# Patient Record
Sex: MALE | Race: WHITE | NOT HISPANIC OR LATINO | Employment: FULL TIME | URBAN - METROPOLITAN AREA
[De-identification: names, ages, dates, MRNs, and addresses within clinical notes are randomized per-mention and may not be internally consistent; named-entity substitution may affect disease eponyms.]

---

## 2017-11-20 ENCOUNTER — COMMUNICATION - HEALTHEAST (OUTPATIENT)
Dept: SCHEDULING | Facility: CLINIC | Age: 42
End: 2017-11-20

## 2017-11-21 ENCOUNTER — OFFICE VISIT - HEALTHEAST (OUTPATIENT)
Dept: INTERNAL MEDICINE | Facility: CLINIC | Age: 42
End: 2017-11-21

## 2017-11-21 ENCOUNTER — RECORDS - HEALTHEAST (OUTPATIENT)
Dept: GENERAL RADIOLOGY | Facility: CLINIC | Age: 42
End: 2017-11-21

## 2017-11-21 DIAGNOSIS — M79.671 PAIN IN RIGHT FOOT: ICD-10-CM

## 2017-11-21 DIAGNOSIS — M79.671 RIGHT FOOT PAIN: ICD-10-CM

## 2017-11-21 ASSESSMENT — MIFFLIN-ST. JEOR: SCORE: 1620.62

## 2017-11-22 ENCOUNTER — COMMUNICATION - HEALTHEAST (OUTPATIENT)
Dept: INTERNAL MEDICINE | Facility: CLINIC | Age: 42
End: 2017-11-22

## 2017-11-22 ENCOUNTER — RECORDS - HEALTHEAST (OUTPATIENT)
Dept: ADMINISTRATIVE | Facility: OTHER | Age: 42
End: 2017-11-22

## 2017-12-06 ENCOUNTER — RECORDS - HEALTHEAST (OUTPATIENT)
Dept: ADMINISTRATIVE | Facility: OTHER | Age: 42
End: 2017-12-06

## 2018-10-01 ENCOUNTER — OFFICE VISIT - HEALTHEAST (OUTPATIENT)
Dept: INTERNAL MEDICINE | Facility: CLINIC | Age: 43
End: 2018-10-01

## 2018-10-01 ENCOUNTER — COMMUNICATION - HEALTHEAST (OUTPATIENT)
Dept: SCHEDULING | Facility: CLINIC | Age: 43
End: 2018-10-01

## 2018-10-01 DIAGNOSIS — N52.9 ED (ERECTILE DYSFUNCTION): ICD-10-CM

## 2018-10-01 DIAGNOSIS — K64.4 EXTERNAL HEMORRHOIDS: ICD-10-CM

## 2018-10-09 ENCOUNTER — RECORDS - HEALTHEAST (OUTPATIENT)
Dept: ADMINISTRATIVE | Facility: OTHER | Age: 43
End: 2018-10-09

## 2020-01-09 ENCOUNTER — COMMUNICATION - HEALTHEAST (OUTPATIENT)
Dept: SCHEDULING | Facility: CLINIC | Age: 45
End: 2020-01-09

## 2020-01-13 ENCOUNTER — COMMUNICATION - HEALTHEAST (OUTPATIENT)
Dept: TELEHEALTH | Facility: CLINIC | Age: 45
End: 2020-01-13

## 2020-01-13 ENCOUNTER — OFFICE VISIT - HEALTHEAST (OUTPATIENT)
Dept: INTERNAL MEDICINE | Facility: CLINIC | Age: 45
End: 2020-01-13

## 2020-01-13 DIAGNOSIS — Z56.0 UNEMPLOYMENT: ICD-10-CM

## 2020-01-13 DIAGNOSIS — K21.00 GASTROESOPHAGEAL REFLUX DISEASE WITH ESOPHAGITIS: ICD-10-CM

## 2020-01-13 SDOH — ECONOMIC STABILITY - INCOME SECURITY: UNEMPLOYMENT, UNSPECIFIED: Z56.0

## 2020-01-16 ENCOUNTER — COMMUNICATION - HEALTHEAST (OUTPATIENT)
Dept: NURSING | Facility: CLINIC | Age: 45
End: 2020-01-16

## 2020-01-20 ENCOUNTER — COMMUNICATION - HEALTHEAST (OUTPATIENT)
Dept: SCHEDULING | Facility: CLINIC | Age: 45
End: 2020-01-20

## 2020-01-20 ENCOUNTER — OFFICE VISIT - HEALTHEAST (OUTPATIENT)
Dept: INTERNAL MEDICINE | Facility: CLINIC | Age: 45
End: 2020-01-20

## 2020-01-20 DIAGNOSIS — R60.9 EDEMA, UNSPECIFIED TYPE: ICD-10-CM

## 2020-03-05 ENCOUNTER — COMMUNICATION - HEALTHEAST (OUTPATIENT)
Dept: INTERNAL MEDICINE | Facility: CLINIC | Age: 45
End: 2020-03-05

## 2020-03-06 ENCOUNTER — AMBULATORY - HEALTHEAST (OUTPATIENT)
Dept: INTERNAL MEDICINE | Facility: CLINIC | Age: 45
End: 2020-03-06

## 2020-03-06 DIAGNOSIS — K22.70 BARRETT'S ESOPHAGUS: ICD-10-CM

## 2020-03-13 ENCOUNTER — RECORDS - HEALTHEAST (OUTPATIENT)
Dept: ADMINISTRATIVE | Facility: OTHER | Age: 45
End: 2020-03-13

## 2021-05-24 ENCOUNTER — RECORDS - HEALTHEAST (OUTPATIENT)
Dept: ADMINISTRATIVE | Facility: CLINIC | Age: 46
End: 2021-05-24

## 2021-05-26 ENCOUNTER — RECORDS - HEALTHEAST (OUTPATIENT)
Dept: ADMINISTRATIVE | Facility: CLINIC | Age: 46
End: 2021-05-26

## 2021-05-30 ENCOUNTER — RECORDS - HEALTHEAST (OUTPATIENT)
Dept: ADMINISTRATIVE | Facility: CLINIC | Age: 46
End: 2021-05-30

## 2021-05-31 ENCOUNTER — RECORDS - HEALTHEAST (OUTPATIENT)
Dept: ADMINISTRATIVE | Facility: CLINIC | Age: 46
End: 2021-05-31

## 2021-05-31 VITALS — WEIGHT: 159.5 LBS | HEIGHT: 71 IN | BODY MASS INDEX: 22.33 KG/M2

## 2021-06-02 VITALS — BODY MASS INDEX: 21.76 KG/M2 | WEIGHT: 156 LBS

## 2021-06-03 ENCOUNTER — RECORDS - HEALTHEAST (OUTPATIENT)
Dept: ADMINISTRATIVE | Facility: CLINIC | Age: 46
End: 2021-06-03

## 2021-06-04 VITALS
HEART RATE: 79 BPM | WEIGHT: 140 LBS | DIASTOLIC BLOOD PRESSURE: 72 MMHG | BODY MASS INDEX: 18.99 KG/M2 | OXYGEN SATURATION: 99 % | SYSTOLIC BLOOD PRESSURE: 114 MMHG | TEMPERATURE: 98.4 F

## 2021-06-04 VITALS
OXYGEN SATURATION: 98 % | DIASTOLIC BLOOD PRESSURE: 84 MMHG | SYSTOLIC BLOOD PRESSURE: 114 MMHG | HEART RATE: 77 BPM | WEIGHT: 140 LBS | BODY MASS INDEX: 18.99 KG/M2

## 2021-06-05 NOTE — PROGRESS NOTES
[facility] Clinic Follow Up Note    José Manuel Toussaint   44 y.o. male    Date of Visit: 1/20/2020    Chief Complaint   Patient presents with     Leg Swelling     bilateral sides - X3 days     Subjective  This is a 44-year-old man who has known esophageal reflux and Catherine's esophagus.  He was seen a week ago in the office and I have reviewed those notes.  Because he is developed some swelling in the lower extremities.  He is not sure if this is related to his twice daily Prilosec or if it is something he is eating.  The only changes in his diet in the past week or that he may have increased his salt intake slightly and his dairy intake slightly.  Otherwise he is feeling a little better but does notice the tightness in the feet and ankles.    ROS A comprehensive review of systems was performed and was otherwise negative    Medications, allergies, and problem list were reviewed and updated    Exam  General Appearance:   On examination today his blood pressure is 114/72.  Weight is 140 pounds which is the same as last week.    Heart rhythm is stable with rate of 80 and no ectopy.    There is just trace to 1+ edema in both lower extremities below the knees.    The patient is alert and oriented x3.      Assessment/Plan  1. Edema, unspecified type       New edema in the lower extremities.  This is certainly not typical of Prilosec but there are very few changes otherwise.  He and I discussed the situation and I suggested that he cut his Prilosec back to once a day for the next few days and also that he reduce the salt and dairy intake in his diet over that same period of time.  If this is not helping he will follow-up with his own physician.      Roscoe Cisneros MD      Current Outpatient Medications on File Prior to Visit   Medication Sig     omeprazole (PRILOSEC) 20 MG capsule Take 1 capsule (20 mg total) by mouth 2 (two) times a day before meals.     ondansetron (ZOFRAN-ODT) 4 MG disintegrating  tablet Take 1 tablet (4 mg total) by mouth every 8 (eight) hours as needed for nausea.     No current facility-administered medications on file prior to visit.      Allergies   Allergen Reactions     Metoclopramide Hcl      Tremor       Prochlorperazine Edisylate      Tremor       Social History     Tobacco Use     Smoking status: Never Smoker     Smokeless tobacco: Never Used   Substance Use Topics     Alcohol use: Not Currently     Frequency: Never     Drug use: Yes     Types: Marijuana     Comment: 1 gram per day

## 2021-06-05 NOTE — TELEPHONE ENCOUNTER
Recently put on heavy dose of Prilosec.    Extreme swelling x 2 days (toes to abdomen)    Skin is tight to touch. Today stomach and hands also swollen today.    Last took Prilosec yesterday.    Low appetite.    No shortness of breath but at times feels breathing is labored.    Does not want to go to Ed due to no insurance currently.    Transferred to scheduling for an appointment for this morning.    Gita Meadows RN [facility] Triage Nurse Advisor

## 2021-06-05 NOTE — PATIENT INSTRUCTIONS - HE
Start taking omeprazole 20 mg two times a day for the next 6-8 weeks. Then take daily  I will also place a referral to connect with our care coordination team to help with addressing your employer and your insurance status. Ideally, it would be great if this was set up so that you can gain insurance. I NEED TO HEAR BACK FROM YOU ONCE THIS IS RESOLVED, SO THAT I CAN PLACE A REFERRAL FOR A REPEAT ENDOCSCOPY  Try to continue to abstain from alcohol  Please abstain from NSAIDs - ibuprofen, aleve, advil and naproxen  You can take acetaminophen, but no more than 3000 mg in 24 hours and 1000 mg at a time.

## 2021-06-05 NOTE — TELEPHONE ENCOUNTER
Reason for Disposition    MODERATE swelling of both ankles (e.g., swelling extends up to the knees) AND new onset or worsening    Protocols used: LEG SWELLING AND EDEMA-A-OH

## 2021-06-05 NOTE — PROGRESS NOTES
[facility] Clinic Note  José Manuel Toussaint   44 y.o. male    Date of Visit: 1/13/2020  Chief Complaint   Patient presents with     [facility]     Hospital Visit Follow Up     [facility] for N&V 1/10-1/11     Assessment/Plan  1. Gastroesophageal reflux disease with esophagitis  Symptoms unlikely related to CVS/CHS.  No documented Catherine's esophagitis and is overdue by roughly 2 years for follow-up EGD.  Counseled to stay well-hydrated, abstain from NSAIDs and minimize alcohol intake.  Will take omeprazole twice daily for 6 to 8 weeks, followed by taking it daily.  Hopefully with reestablishment of insurance, will then let me know via telephone/MyChart, for me to place a referral for EGD.  We will continue to use omeprazole as needed in light of side effects from Reglan and Compazine.  - omeprazole (PRILOSEC) 20 MG capsule; Take 1 capsule (20 mg total) by mouth 2 (two) times a day before meals.  Dispense: 90 capsule; Refill: 1    2. Unemployment  - Ambulatory referral to Care Management (Primary Care)     Much or all of the text in this note was generated through the use of Dragon Dictate voice-to-text software. Errors in spelling or words which seem out of context are unintentional. Sound alike errors, in particular, may have escaped editing  Sin Navarrete MD    Return if symptoms worsen or fail to improve.    Subjective  This 44 y.o. old male who presents for an ED follow-up. Admitted on 1/10/2020 after presenting with nausea and vomiting.  Diagnosed with cannabis hyperemesis syndrome, abnormal LFTs, acute kidney injury (2.41) and leukocytosis (16) presumed reactive.  Lipase 25. Seen with similar symptoms a few days earlier, and discharged with Zofran at that time.  By 1/11/2020, creatinine improved to 1.05, WBC 10.0 and lactic acid 1.0.   Seems like he is having a nausea in the AM. Woke up this AM, and still dealing nausea. Taking zofran first thing in the AM. No dry heaving and  no emesis. Appetite has been poor. Endorses abstaining since discharge. NO cough, shortness of breath or fever. Endorse smoking it for recreational reasons. 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   Dx with [employer] in 2015, with plan to f/u in 3 years, which was not done. Always a thin person. No long term night sweats outside of this recent episode. Endorses not using the omeprazole 20 mg daily for 2 years. Allergic to compazine and reglan. Trying to drink as much water. States his stomach feels tight, but no chest pain. No hematemesis. QTc 418 ms on 1/1/2020. No blood in stool or hematemesis. Was taking Aleve daily 2 to 4 days prior to this episode. Do not drink alcohol. Does not smoke cigarettes.    Works as a reynoso, with a [employer].  Currently unemployed but hoping to get some projects in the near future.    ROS A comprehensive review of systems was performed and was otherwise negative    Medications, allergies, and problem list were reviewed and updated    Exam  General appearance: Pleasant, nontoxic-appearing, no acute distress, alert and oriented x4  Vitals:    01/13/20 0934   BP: 114/84   Pulse: 77   SpO2: 98%   EYES: Eyelids, conjunctiva, and sclera were normal.  HEAD, EARS, NOSE, MOUTH, AND THROAT: Head and face were normal. Hearing was normal to voice. Oropharynx was normal.  RESPIRATORY: Bilaterally with no crackles, wheezing or rhonchi  CARDIOVASCULAR: Regular S1 and S2.  Radial pulses intact.  No edema.  GASTROINTESTINAL: NABS, soft, nontender, nondistended, no hepatomegaly  MUSCULOSKELETAL: Skeletal configuration was normal and muscle mass was normal for age.   Additional Information   Current Outpatient Medications   Medication Sig Dispense Refill     ondansetron (ZOFRAN-ODT) 4 MG disintegrating tablet Take 1 tablet (4 mg total) by mouth every 8 (eight) hours as needed for nausea. 30 tablet 0     omeprazole (PRILOSEC) 20 MG capsule Take 1 capsule (20 mg total) by mouth 2 (two)  times a day before meals. 90 capsule 1     No current facility-administered medications for this visit.      Allergies   Allergen Reactions     Metoclopramide Hcl      Tremor       Prochlorperazine Edisylate      Tremor       Social History     Social History Narrative     Not on file     Social History     Tobacco Use     Smoking status: Never Smoker     Smokeless tobacco: Never Used   Substance Use Topics     Alcohol use: Not Currently     Frequency: Never     Drug use: Yes     Types: Marijuana     Comment: 1 gram per day     No family history on file.  Time: total time spent with the patient was 25 minutes of which >50% was spent in counseling and coordination of care

## 2021-06-05 NOTE — PROGRESS NOTES
Patient has talked with someone about insurance and knows he is over income to qualify for programs to assist him. He believes he will be over income for tyler care as well. He has declined enrollment in [facility] at this time.  CHW will keep patient's information and will send him information if there are any programs to keep him out of this situation in the future. May need to talk with his union about COBRA plans.    The clinic Community Health Worker talked with the patient today at the request of the PCP to discuss possible Clinic Care Coordination enrollment.  The service was described to the patient and immediate needs were discussed.  The patient declined enrollement at this time.  The PCP is encouraged to refer in the future if the patient's needs change.

## 2021-06-05 NOTE — TELEPHONE ENCOUNTER
Tuesday vomiting started. Was seen in ED.    From smoking too much pot.    2 days later now and not getting in any water.  Minimal urine output. Still vomiting.    No pcp.    Asking if he can go to urgent care for IV, morphine and anti nausea meds.    Needs to return to ED.  Patient agrees with plan.    Gita Meadows RN [facility] Triage Nurse Advisor    Reason for Disposition    SEVERE vomiting (e.g., 6 or more times/day)    Protocols used: VOMITING-A-OH

## 2021-06-06 NOTE — TELEPHONE ENCOUNTER
Order needed:    Patient now has insurance and wants to move forward with scheduling an upper endoscopy. Patient talked about this with Dr. Navarrete during the 1/13/20 appointment.     Please place order if ok to move forward.     Thank you

## 2021-06-14 NOTE — PROGRESS NOTES
"Mitesh McGlone  1975      Assessment and Plan:  1 sprained right foot; x-ray looks negative; will continue to use walking boot; symptoms seem somewhat disproportionate to physical findings we will get him in to see podiatry      Chief Complaint: Right foot pain injury    Visit diagnoses:    1. Right foot pain  XR Foot Right 3 or More VWS    Ambulatory referral to Podiatry       Meds:  Current Outpatient Prescriptions   Medication Sig Dispense Refill     lansoprazole (PREVACID SOLUTAB) 30 MG disintegrating tablet Take 1 tablet (30 mg total) by mouth daily. 90 tablet 3     ondansetron (ZOFRAN) 4 MG tablet Take 1 tablet (4 mg total) by mouth every 8 (eight) hours as needed for nausea. 20 tablet 4     No current facility-administered medications for this visit.        Allergies   Allergen Reactions     Metoclopramide Hcl      Tremor       Prochlorperazine Edisylate      Tremor         ROS: complete review of symptoms otherwise negative except as noted below    S: About a week and a half ago he was running with his dog tripped and his dog stepped on his right foot.  Kishan has had pain ever since he has a walking boot left over from when he fractured his ankle things do not seem to be getting much better so that he made the appointment today.       O:   Vitals:    11/21/17 0952   BP: 108/68   Patient Site: Left Arm   Patient Position: Sitting   Cuff Size: Adult Regular   Pulse: 72   Resp: 16   SpO2: 99%   Weight: 159 lb 8 oz (72.3 kg)   Height: 5' 11\" (1.803 m)       Physical Exam:  General-  VS- see above    Extremities: no edema, good distal pulses; minimal tenderness over the base of the second and third toe distal metatarsal on the right foot not a lot of edema good pulses plantar palpation unremarkable      X-ray of right foot negative will await radiologist official interpretation      Jayme Ca MD              "

## 2021-06-16 PROBLEM — N17.9 AKI (ACUTE KIDNEY INJURY) (H): Status: ACTIVE | Noted: 2020-01-10

## 2021-06-20 NOTE — PROGRESS NOTES
José Manuel Toussaint  1975      Assessment and Plan:  1 recurrent external hemorrhoids refer to colorectal surgery; Anusol suppositories sitz bath he is currently not constipated  2 he has a girlfriend wants some Viagra is got some ED issues which are not major will send in a prescription not sure if his insurance will cover if not he can try to get them from [facility]      Chief Complaint: Hemorrhoids and ED    Visit diagnoses:    1. External hemorrhoids  Ambulatory referral to Colorectal Surgery    hydrocortisone 25 mg suppository   2. ED (erectile dysfunction)  sildenafil (VIAGRA) 100 MG tablet       Meds:  Current Outpatient Prescriptions   Medication Sig Dispense Refill     hydrocortisone 25 mg suppository Insert 1 suppository (25 mg total) into the rectum every 12 (twelve) hours. 12 suppository 1     sildenafil (VIAGRA) 100 MG tablet Take 1 tablet (100 mg total) by mouth as needed for erectile dysfunction. 6 tablet 12     No current facility-administered medications for this visit.        Allergies   Allergen Reactions     Metoclopramide Hcl      Tremor       Prochlorperazine Edisylate      Tremor         ROS: complete review of symptoms otherwise negative except as noted below    S: Overall José Manuel is doing reasonably well he is not having much in way gastrointestinal symptoms no recurrent nausea and vomiting problems he is cut down in the amount of pot he smokes but has not abstained entirely.  Does not drink alcohol.  He has hemorrhoid problems and needs a referral he also wants to try some Viagra and has been sent in if it is too expensive we can try to help him get some from [facility]       O:   Vitals:    10/01/18 1106   BP: 108/62   Patient Site: Left Arm   Patient Position: Sitting   Cuff Size: Adult Regular   Pulse: 78   Weight: 156 lb (70.8 kg)       Physical Exam:  General-  VS- see above      Jayme Ca MD

## 2021-11-24 ENCOUNTER — ANCILLARY PROCEDURE (OUTPATIENT)
Dept: GENERAL RADIOLOGY | Facility: CLINIC | Age: 46
End: 2021-11-24
Attending: NURSE PRACTITIONER
Payer: COMMERCIAL

## 2021-11-24 ENCOUNTER — OFFICE VISIT (OUTPATIENT)
Dept: URGENT CARE | Facility: URGENT CARE | Age: 46
End: 2021-11-24
Payer: COMMERCIAL

## 2021-11-24 VITALS
RESPIRATION RATE: 15 BRPM | OXYGEN SATURATION: 96 % | BODY MASS INDEX: 20.3 KG/M2 | TEMPERATURE: 98.6 F | DIASTOLIC BLOOD PRESSURE: 66 MMHG | HEART RATE: 77 BPM | HEIGHT: 71 IN | SYSTOLIC BLOOD PRESSURE: 98 MMHG | WEIGHT: 145 LBS

## 2021-11-24 DIAGNOSIS — R05.9 COUGH: ICD-10-CM

## 2021-11-24 DIAGNOSIS — J40 BRONCHITIS: Primary | ICD-10-CM

## 2021-11-24 PROCEDURE — 71046 X-RAY EXAM CHEST 2 VIEWS: CPT | Performed by: RADIOLOGY

## 2021-11-24 PROCEDURE — 99214 OFFICE O/P EST MOD 30 MIN: CPT | Performed by: NURSE PRACTITIONER

## 2021-11-24 RX ORDER — AZITHROMYCIN 250 MG/1
TABLET, FILM COATED ORAL
Qty: 6 TABLET | Refills: 0 | Status: SHIPPED | OUTPATIENT
Start: 2021-11-24 | End: 2021-11-29

## 2021-11-24 RX ORDER — BENZONATATE 100 MG/1
100 CAPSULE ORAL 3 TIMES DAILY PRN
Qty: 21 CAPSULE | Refills: 0 | Status: SHIPPED | OUTPATIENT
Start: 2021-11-24 | End: 2021-12-01

## 2021-11-24 ASSESSMENT — MIFFLIN-ST. JEOR: SCORE: 1554.85

## 2021-11-24 NOTE — PATIENT INSTRUCTIONS
Zpak and tessalon perles for bronchitis  There is discoid atelectasis, fluid mucus in lungs  No lobar pneumonia  Rest! Your body needs more rest to heal.  Drink plenty of fluids (warm fluids like tea or soup are soothing and reduce cough)  Sit in the bathroom with a hot shower running and breathe in the steam.  Honey may soothe your sore throat and help manage your cough- may take straight or in warm water with lemon juice.  Avoid smoke (cigarettes, bonfires, fireplace, wood burning stoves).  Take Tylenol or an NSAID such as ibuprofen or naproxen as needed for pain.  Delsym (dextromethorphan polistirex) is an over the counter cough medication that lasts 12 hours.   Mucinex or Robitussin (guiafenesin) thin mucus and may help it to loosen more quickly  Good handwashing is the best way to prevent spread of germs  Present to emergency room if you develop trouble breathing, swallowing or cough-up blood, worsening chest pain, palpitations, calf pain, dizziness.  Follow up with your primary care provider if symptoms worsen or fail to improve as expected.

## 2021-11-24 NOTE — PROGRESS NOTES
"Chief Complaint   Patient presents with     Urgent Care     URI     coughing since 11/16, would like chest xray     SUBJECTIVE:  José Manuel Toussaint is a 46 year old male presenting with concerns for a lung infection. He has had cough, sob, chest heaviness, thick mucus, wheezing for 9 days. Asthma history, albuterol not helping. He smokes marijuana. No relevant past cardiopulmonary history. No dizziness, palpitations, calf swelling, hemoptysis, fever. His covid test yesterday was negative.    Past Medical History:   Diagnosis Date     Catherine's esophagus      Esophageal reflux      Gastropathy      GERD (gastroesophageal reflux disease)      Nausea with vomiting      omeprazole (PRILOSEC) 20 MG capsule, [OMEPRAZOLE (PRILOSEC) 20 MG CAPSULE] Take 1 capsule (20 mg total) by mouth 2 (two) times a day before meals.  ondansetron (ZOFRAN-ODT) 4 MG disintegrating tablet, [ONDANSETRON (ZOFRAN-ODT) 4 MG DISINTEGRATING TABLET] Take 1 tablet (4 mg total) by mouth every 8 (eight) hours as needed for nausea.    No current facility-administered medications on file prior to visit.    Social History     Tobacco Use     Smoking status: Never Smoker     Smokeless tobacco: Never Used   Substance Use Topics     Alcohol use: Not Currently     Allergies   Allergen Reactions     Metoclopramide Hcl [Metoclopramide] Unknown     Tremor       Nsaids      Prochlorperazine Edisylate [Prochlorperazine] Unknown     Tremor       Review of Systems   All systems negative except for those listed above in HPI.    OBJECTIVE:   BP 98/66   Pulse 77   Temp 98.6  F (37  C) (Temporal)   Resp 15   Ht 1.803 m (5' 11\")   Wt 65.8 kg (145 lb)   SpO2 96%   BMI 20.22 kg/m       Physical Exam  Vitals reviewed.   Constitutional:       General: He is not in acute distress.     Appearance: Normal appearance. He is not ill-appearing, toxic-appearing or diaphoretic.   HENT:      Head: Normocephalic and atraumatic.      Nose: Nose normal.   Cardiovascular:      Rate " and Rhythm: Normal rate.      Pulses: Normal pulses.      Heart sounds: Normal heart sounds.   Pulmonary:      Effort: Respiratory distress (cough) present.      Breath sounds: No stridor. Wheezing and rhonchi present. No rales.   Skin:     General: Skin is warm and dry.   Neurological:      General: No focal deficit present.      Mental Status: He is alert and oriented to person, place, and time.      Cranial Nerves: No cranial nerve deficit.      Motor: No weakness.      Gait: Gait normal.   Psychiatric:         Mood and Affect: Mood normal.         Behavior: Behavior normal.       Xray done in clinic read by me as positive for right sided discoid atelectasis, mild hazy opacities.  Results for orders placed or performed in visit on 11/24/21   XR Chest 2 Views     Status: None    Narrative    XR CHEST 2 VW  11/24/2021 12:42 PM       INDICATION: cough, sob, chest heaviness, thick mucus, wheezing for 9  days, asthma history, albuterol not helping, left lung rhonchi,  wheezes  COMPARISON: None       Impression    IMPRESSION: The lungs are hyperinflated. There is discoid atelectasis  or scarring in the left lung base. Lungs are otherwise clear. Heart  size normal.    KELSEY KHAN MD         SYSTEM ID:  [id number]     ASSESSMENT:    ICD-10-CM    1. Bronchitis  J40 azithromycin (ZITHROMAX) 250 MG tablet     benzonatate (TESSALON) 100 MG capsule   2. Cough  R05.9 XR Chest 2 Views     PLAN:     Zpak and tessalon perles for bronchitis  There is discoid atelectasis, fluid mucus in lungs  No lobar pneumonia  Rest! Your body needs more rest to heal.  Drink plenty of fluids (warm fluids like tea or soup are soothing and reduce cough)  Sit in the bathroom with a hot shower running and breathe in the steam.  Honey may soothe your sore throat and help manage your cough- may take straight or in warm water with lemon juice.  Avoid smoke (cigarettes, bonfires, fireplace, wood burning stoves).  Take Tylenol or an NSAID such as  ibuprofen or naproxen as needed for pain.  Delsym (dextromethorphan polistirex) is an over the counter cough medication that lasts 12 hours.   Mucinex or Robitussin (guiafenesin) thin mucus and may help it to loosen more quickly  Good handwashing is the best way to prevent spread of germs  Present to emergency room if you develop trouble breathing, swallowing or cough-up blood, worsening chest pain, palpitations, calf pain, dizziness.  Follow up with your primary care provider if symptoms worsen or fail to improve as expected.    Follow up with primary care provider with any problems, questions or concerns or if symptoms worsen or fail to improve. Patient agreed to plan and verbalized understanding.    DAVID Mai-[facility] C[facility] [place]

## 2022-04-02 ENCOUNTER — HEALTH MAINTENANCE LETTER (OUTPATIENT)
Age: 47
End: 2022-04-02

## 2022-10-01 ENCOUNTER — HEALTH MAINTENANCE LETTER (OUTPATIENT)
Age: 47
End: 2022-10-01

## 2023-05-14 ENCOUNTER — HEALTH MAINTENANCE LETTER (OUTPATIENT)
Age: 48
End: 2023-05-14

## 2023-11-02 ENCOUNTER — HOSPITAL ENCOUNTER (EMERGENCY)
Facility: HOSPITAL | Age: 48
Discharge: HOME | End: 2023-11-02
Payer: COMMERCIAL

## 2023-11-02 ENCOUNTER — OFFICE VISIT (OUTPATIENT)
Dept: FAMILY MEDICINE | Facility: CLINIC | Age: 48
End: 2023-11-02
Payer: COMMERCIAL

## 2023-11-02 VITALS
WEIGHT: 144 LBS | HEART RATE: 65 BPM | BODY MASS INDEX: 20.08 KG/M2 | DIASTOLIC BLOOD PRESSURE: 68 MMHG | RESPIRATION RATE: 14 BRPM | SYSTOLIC BLOOD PRESSURE: 112 MMHG | TEMPERATURE: 98.4 F | OXYGEN SATURATION: 95 %

## 2023-11-02 DIAGNOSIS — R07.9 CHEST PAIN, UNSPECIFIED TYPE: Primary | ICD-10-CM

## 2023-11-02 LAB
ATRIAL RATE - MUSE: 64 BPM
DIASTOLIC BLOOD PRESSURE - MUSE: NORMAL MMHG
INTERPRETATION ECG - MUSE: NORMAL
P AXIS - MUSE: -13 DEGREES
PR INTERVAL - MUSE: 150 MS
QRS DURATION - MUSE: 80 MS
QT - MUSE: 372 MS
QTC - MUSE: 383 MS
R AXIS - MUSE: 76 DEGREES
SYSTOLIC BLOOD PRESSURE - MUSE: NORMAL MMHG
T AXIS - MUSE: 65 DEGREES
VENTRICULAR RATE- MUSE: 64 BPM

## 2023-11-02 PROCEDURE — 93010 ELECTROCARDIOGRAM REPORT: CPT | Performed by: INTERNAL MEDICINE

## 2023-11-02 PROCEDURE — 99214 OFFICE O/P EST MOD 30 MIN: CPT | Mod: 25 | Performed by: STUDENT IN AN ORGANIZED HEALTH CARE EDUCATION/TRAINING PROGRAM

## 2023-11-02 PROCEDURE — 93005 ELECTROCARDIOGRAM TRACING: CPT | Performed by: STUDENT IN AN ORGANIZED HEALTH CARE EDUCATION/TRAINING PROGRAM

## 2023-11-02 ASSESSMENT — ACTIVITIES OF DAILY LIVING (ADL)
ADLS_ACUITY_SCORE: 33
ADLS_ACUITY_SCORE: 33

## 2023-11-02 NOTE — PATIENT INSTRUCTIONS
Go to the Emergency Department for chest pain rule out    Informed that this institution does possess the capabilities and/or resources to provide for patient and should be transferred to a higher level of care.     Yes

## 2023-11-02 NOTE — ED NOTES
Expected Patient Referral to ED  12:27 PM    Referring Clinic/Provider:  [facility]    Reason for referral/Clinical facts:  48-year-old with history of reflux, 1 week of chest pressure, normal EKG    Recommendations provided:  Send to ED for further evaluation    Caller was informed that this institution does possess the capabilities and/or resources to provide for patient and should be transferred to our facility.    Discussed that if direct admit is sought and any hurdles are encountered, this ED would be happy to see the patient and evaluate.    Informed caller that recommendations provided are recommendations based only on the facts provided and that they responsible to accept or reject the advice, or to seek a formal in person consultation as needed and that this ED will see/treat patient should they arrive.      Rl Miguel MD  [facility] EMERGENCY DEPARTMENT  [address] [place] [id number]  [phone]       Rl Miguel MD  11/02/23 1227

## 2023-11-02 NOTE — PROGRESS NOTES
"Assessment & Plan     Chest pain, unspecified type  48-year-old man with history of daily marijuana use, GERD who presents with 1 week of constant sternal chest pain.  Differential includes but not limited to myocardial infarction, pericarditis, pleuritic pain, pneumonia, GERD, PE.  Vitals are within normal limits. On exam, the patient is nontoxic-appearing, well-hydrated and perfused, no respiratory distress, no murmur, or signs of volume overload.  ECG showed no ischemic changes, no ECG to compare. HEAR score 3. Informed that this institution does possess the capabilities and/or resources to provide for patient and should be transferred to a higher level of care.  Recommended patient go to the ED for further evaluation.  Signout given to [facility] ED provider.  The patient was discharged in stable condition and transferred by private car.  - EKG 12-lead, tracing only  - EKG 12-lead, tracing only             No follow-ups on file.    Trista Wood MD  [facility] DYLON Georges is a 48 year old male who presents to clinic today for the following health issues:  Chief Complaint   Patient presents with    Chest Pain     X 1 week, tight chest, feels like someone is stopping on his chest, negative covid test today.      HPI    Cardiac Event    Symptom Onset: 1 week(s) ago.  Timing of illness: constant  Character: chest tightness, sharp, \"feels like someone stepping on his chest\"  Current and Associated symptoms: has nasal congestion, dizziness with bending over  Denies fever, cough, nausea, shortness of breath  Severity: 7/10  Location: sternal  Radiation: none.  Associated Symptoms: none.  Exacerbated by: inhalation, sitting. 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      Objective    /68 (BP Location: Right arm, Patient Position: Sitting, Cuff Size: Adult Regular)   Pulse 65   Temp 98.4  F (36.9  C) (Oral)   Resp 14   Wt 65.3 kg (144 lb)   SpO2 95%   BMI 20.08 kg/m    Physical Exam   GENERAL: healthy, alert and no distress, nontoxic  EYES: Eyes grossly normal to inspection, and conjunctivae and sclerae normal  HENT: nose and mouth without ulcers or lesions  RESP: no increased work of breathing, symmetric air entry, lungs clear to auscultation - no rales, rhonchi or wheezes  CV: regular rate and rhythm, normal S1 S2, no S3 or S4, no murmur, normal cap refill, and peripheral pulses strong, no peripheral edema  MS: no gross musculoskeletal defects noted, no edema  SKIN: no suspicious lesions or rashes  NEURO: No focal neurologic deficits    EKG - Reviewed and interpreted by me appears normal, NSR, normal axis, normal intervals, no acute ST/T changes c/w ischemia, no LVH by voltage criteria, unchanged from previous tracings

## 2024-01-25 ENCOUNTER — OFFICE VISIT (OUTPATIENT)
Dept: FAMILY MEDICINE | Facility: CLINIC | Age: 49
End: 2024-01-25
Payer: COMMERCIAL

## 2024-01-25 ENCOUNTER — ANCILLARY PROCEDURE (OUTPATIENT)
Dept: GENERAL RADIOLOGY | Facility: CLINIC | Age: 49
End: 2024-01-25
Attending: FAMILY MEDICINE
Payer: COMMERCIAL

## 2024-01-25 VITALS
BODY MASS INDEX: 20.82 KG/M2 | HEIGHT: 71 IN | DIASTOLIC BLOOD PRESSURE: 68 MMHG | OXYGEN SATURATION: 95 % | RESPIRATION RATE: 13 BRPM | TEMPERATURE: 97.8 F | HEART RATE: 80 BPM | WEIGHT: 148.75 LBS | SYSTOLIC BLOOD PRESSURE: 110 MMHG

## 2024-01-25 DIAGNOSIS — F41.1 GENERALIZED ANXIETY DISORDER: ICD-10-CM

## 2024-01-25 DIAGNOSIS — Z77.090 HISTORY OF ASBESTOS EXPOSURE: ICD-10-CM

## 2024-01-25 DIAGNOSIS — Z12.11 SCREEN FOR COLON CANCER: ICD-10-CM

## 2024-01-25 DIAGNOSIS — Z11.4 SCREENING FOR HIV (HUMAN IMMUNODEFICIENCY VIRUS): ICD-10-CM

## 2024-01-25 DIAGNOSIS — M75.82 ROTATOR CUFF TENDONITIS, LEFT: ICD-10-CM

## 2024-01-25 DIAGNOSIS — R11.2 NAUSEA AND VOMITING, UNSPECIFIED VOMITING TYPE: ICD-10-CM

## 2024-01-25 DIAGNOSIS — R73.9 HYPERGLYCEMIA: ICD-10-CM

## 2024-01-25 DIAGNOSIS — F32.A DEPRESSION, UNSPECIFIED DEPRESSION TYPE: ICD-10-CM

## 2024-01-25 DIAGNOSIS — F12.90 MARIJUANA SMOKER, CONTINUOUS: ICD-10-CM

## 2024-01-25 DIAGNOSIS — G89.29 CHRONIC PAIN OF RIGHT KNEE: ICD-10-CM

## 2024-01-25 DIAGNOSIS — K22.719 BARRETT'S ESOPHAGUS WITH DYSPLASIA: ICD-10-CM

## 2024-01-25 DIAGNOSIS — H93.13 TINNITUS, BILATERAL: ICD-10-CM

## 2024-01-25 DIAGNOSIS — M25.561 CHRONIC PAIN OF RIGHT KNEE: ICD-10-CM

## 2024-01-25 DIAGNOSIS — Z00.00 HEALTHCARE MAINTENANCE: Primary | ICD-10-CM

## 2024-01-25 DIAGNOSIS — Z11.59 NEED FOR HEPATITIS C SCREENING TEST: ICD-10-CM

## 2024-01-25 PROBLEM — K21.9 ESOPHAGEAL REFLUX: Status: ACTIVE | Noted: 2024-01-25

## 2024-01-25 PROBLEM — K22.70 BARRETT'S ESOPHAGUS: Status: ACTIVE | Noted: 2024-01-25

## 2024-01-25 LAB
CHOLEST SERPL-MCNC: 70 MG/DL
FASTING STATUS PATIENT QL REPORTED: NO
GLUCOSE BLD-MCNC: 170 MG/DL (ref 60–99)
HCV AB SERPL QL IA: NONREACTIVE
HDLC SERPL-MCNC: 34 MG/DL
HIV 1+2 AB+HIV1 P24 AG SERPL QL IA: NONREACTIVE
LDLC SERPL CALC-MCNC: 24 MG/DL
NONHDLC SERPL-MCNC: 36 MG/DL
TRIGL SERPL-MCNC: 58 MG/DL
TSH SERPL DL<=0.005 MIU/L-ACNC: 1.69 UIU/ML (ref 0.3–4.2)

## 2024-01-25 PROCEDURE — 83036 HEMOGLOBIN GLYCOSYLATED A1C: CPT | Performed by: FAMILY MEDICINE

## 2024-01-25 PROCEDURE — 87389 HIV-1 AG W/HIV-1&-2 AB AG IA: CPT | Performed by: FAMILY MEDICINE

## 2024-01-25 PROCEDURE — 99213 OFFICE O/P EST LOW 20 MIN: CPT | Mod: 25 | Performed by: FAMILY MEDICINE

## 2024-01-25 PROCEDURE — 82947 ASSAY GLUCOSE BLOOD QUANT: CPT | Performed by: FAMILY MEDICINE

## 2024-01-25 PROCEDURE — 80061 LIPID PANEL: CPT | Performed by: FAMILY MEDICINE

## 2024-01-25 PROCEDURE — 86803 HEPATITIS C AB TEST: CPT | Performed by: FAMILY MEDICINE

## 2024-01-25 PROCEDURE — 71046 X-RAY EXAM CHEST 2 VIEWS: CPT | Mod: TC | Performed by: RADIOLOGY

## 2024-01-25 PROCEDURE — 36415 COLL VENOUS BLD VENIPUNCTURE: CPT | Performed by: FAMILY MEDICINE

## 2024-01-25 PROCEDURE — 99396 PREV VISIT EST AGE 40-64: CPT | Mod: 25 | Performed by: FAMILY MEDICINE

## 2024-01-25 PROCEDURE — 90715 TDAP VACCINE 7 YRS/> IM: CPT | Performed by: FAMILY MEDICINE

## 2024-01-25 PROCEDURE — 84443 ASSAY THYROID STIM HORMONE: CPT | Performed by: FAMILY MEDICINE

## 2024-01-25 PROCEDURE — 90471 IMMUNIZATION ADMIN: CPT | Performed by: FAMILY MEDICINE

## 2024-01-25 RX ORDER — ESCITALOPRAM OXALATE 10 MG/1
10 TABLET ORAL DAILY
Qty: 90 TABLET | Refills: 1 | Status: SHIPPED | OUTPATIENT
Start: 2024-01-25 | End: 2024-01-25

## 2024-01-25 ASSESSMENT — ENCOUNTER SYMPTOMS: NERVOUS/ANXIOUS: 1

## 2024-01-25 NOTE — COMMUNITY RESOURCES LIST (ENGLISH)
01/25/2024    [employer]  N/A  For questions about this resource list or additional care needs, please contact your primary care clinic or care manager.  Phone: [phone]   Email: N/A   Address: [address] [place] [id number]   Hours: N/A        Financial Stability       Rent and mortgage payment assistance  1  [facility] - Outpatient [facility] Distance: 0.78 miles      In-Person, Phone/Virtual   [address] [place] [address]  Language: English, Azeri  Hours: Mon - Fri 8:00 AM - 4:30 PM  Fees: Insurance   Phone: (414) 341-6278 Email: roots@[employer] Website: [email or web address]/     2  [facility] [employer] [place] - Multifamily Rental Assistance Program Distance: 3.05 miles      Phone/Virtual   [address] [place] [address]  Language: English  Hours: Mon - Fri 8:00 AM - 5:00 PM Appt. Only  Fees: Free   Phone: (194) 209-6092 Email: [place].[employer]@[facility]. Website: https://www.[employer].gov/index.html          Important Numbers & Websites       Emergency Services   911  [facility]   311  Poison Control   (569) 381-5818  Suicide Prevention Lifeline   (200) 945-4164 (TALK)  Child Abuse Hotline   (612) 449-6139 (4-A-Child)  Sexual Assault Hotline   (939) 535-8718 (HOPE)  National Runaway Safeline   (306) 854-5636 (RUNAWAY)  All-Options Talkline   (825) 369-5941  Substance Abuse Referral   (304) 920-6140 (HELP)

## 2024-01-25 NOTE — PROGRESS NOTES
Adult Male Physical  A/P  Catherine's esophagus  Patient reports to me today that he been told that he should have annual screenings.  Reviewing Dr. Diggs's note, no dysplasia noted.  Has been years since this was considered, referral back to [facility] for upper endoscopy.    Depression, unspecified depression type  Long-term issue, PHQ-9 today is 8.  Denies active suicidal ideation.  Anxiety seems more the problem.  Lexapro, referral to therapist.    Family history of addiction, not mental illness.  Current stressor of mom's failing health.    Generalized anxiety disorder  Chronic issue, SULEMAN 7 is 14.  Please note SULEMAN is a provisional diagnosis, I have not reviewed diagnostic criteria.  Seems to have some OCD characteristics or at least skin picking.  Referral to therapist, start Lexapro, follow-up 1 month.    Healthcare maintenance  Tdap, colon cancer screening with colonoscopy.    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  No breathing issues.  Requesting chest x-ray which I think is reasonable.  This was personally reviewed and appears normal.    Marijuana smoker, continuous  Briefly explored today, understands he is dependent on marijuana and is felt better when he stopped it in the past.  Further discussion going forward.  Treat anxiety and depression.    Nausea with vomiting  Does not seem to be related to marijuana use, better with cessation of nonsteroidals.    Rotator cuff tendonitis, left  Negative x-ray, fairly mild, seems prepared supraspinatus related.  Referral to physical therapy.    Tinnitus, bilateral  Lots of noise exposure, referral to ENT         HPI  Current Concerns/ Questions    48-year-old, history of esophageal reflux with Catherine's, nausea and vomiting, history of acute kidney injury.  Currently on omeprazole 20 mg, has ondansetron as well.     Patient has lots of questions today,  looking to establish care.    Lives with mom, has been under stress lately as she is going to be needing to go into care facility in the near future.  Starting to have memory issues.    Long-term history of generalized anxiety and depression.  He worries about everything, is also a skin , this began for 5 years ago.  Never has received any sort of mental health care in the past.  Not easy for him to talk about things.  Denies hallucinations.  There is a family history of alcoholism.    Patient is a daily marijuana smoker.  Not sure how he feels about this, knows that he is dependent on it.    History of Catherine's esophagus.  Also had vomiting in the past.  Diagnosed with gastropathy and told not to take nonsteroidals.  Found out that A[place]ha[place]ille is a nonsteroidal recently, once he stopped that his nausea got better.  Does not seem to be related to his marijuana use.    4 months of left shoulder pain when he reaches up and out to the left.  Very particular action that does this.'s first time he is ever had it.  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  Striae of left knee pain.  Previous injury as a child, led to surgery on his knee.  Wants to see a orthopedic surgeon to look into this.    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  Wondering if he should get a chest x-ray to get screened for asbestosis.    Lots of loud machines around, bilateral, right greater than left tinnitus over the last couple of years.  No noted hearing loss.    Laureen history of melanoma, also with basal cell carcinoma in his mom.  PFSH:  Current medications reviewed as follows:  omeprazole (PRILOSEC) 20 MG capsule, [OMEPRAZOLE (PRILOSEC) 20 MG CAPSULE] Take 1 capsule (20 mg total) by mouth 2 (two) times a day before meals. (Patient not taking: Reported on 1/25/2024)  ondansetron (ZOFRAN-ODT) 4 MG disintegrating tablet, [ONDANSETRON (ZOFRAN-ODT) 4 MG DISINTEGRATING TABLET] Take 1 tablet (4 mg total) by mouth  every 8 (eight) hours as needed for nausea. (Patient not taking: Reported on 1/25/2024)    No current facility-administered medications on file prior to visit.     Patient Active Problem List   Diagnosis    Esophageal reflux    Catherine's esophagus    Nausea with vomiting    Closed Fracture Of The Fibula    Gastropathy    SYLVIA (acute kidney injury) (H24)    Dysphagia    Healthcare maintenance    Generalized anxiety disorder    Depression, unspecified depression type    Marijuana smoker, continuous    Rotator cuff tendonitis, left    History of asbestos exposure    Tinnitus, bilateral     Past Medical History:   Diagnosis Date    Catherine's esophagus     Esophageal reflux     Gastropathy     GERD (gastroesophageal reflux disease)     Nausea with vomiting      Past Surgical History:   Procedure Laterality Date    HC KNEE SCOPE, DIAGNOSTIC      Description: Arthroscopy Knee;  Recorded: 11/19/2007;    ZZC APPENDECTOMY      Description: Appendectomy;  Recorded: 02/20/2009;    [facility],<4Y/O,REDUC      Description: Umbilical Hernia Repair;  Recorded: 03/15/2012;     No family history on file.  History   Smoking Status    Never   Smokeless Tobacco    Never       Social History     Social History Narrative    Not on file     Immunization History   Administered Date(s) Administered    COVID-19 12+ (2023-24) (MODERNA) 10/20/2023    COVID-19 Bivalent 18+ (Moderna) 11/04/2022    COVID-19 Monovalent 18+ (Moderna) 04/07/2021, 05/05/2021, 11/12/2021, 05/13/2022    Flu, Unspecified 11/21/2013, 11/04/2019    Influenza (IIV3) PF 11/19/2012, 11/20/2013    Influenza Vaccine >6 months,quad, PF 10/07/2017, 09/25/2020, 11/04/2022    Influenza Vaccine, 6+MO IM (QUADRIVALENT W/PRESERVATIVES) 10/01/2018, 11/04/2019    Influenza,INJ,MDCK,PF,Quad >6mo(Flucelvax) 11/12/2021, 09/23/2023    TDAP (Adacel,Boostrix) 05/22/2012, 01/25/2024    Td (Adult), Adsorbed 01/01/1999, 01/01/2003    Td,adult,historic,unspecified 01/01/1999,  "01/01/2003     Body mass index is 20.96 kg/m .        Objective  Physical Exam  Vitals:    01/25/24 0836   BP: 110/68   BP Location: Left arm   Patient Position: Sitting   Cuff Size: Adult Regular   Pulse: 80   Resp: 13   Temp: 97.8  F (36.6  C)   TempSrc: Oral   SpO2: 95%   Weight: 67.5 kg (148 lb 12 oz)   Height: 1.794 m (5' 10.63\")       Gen- alert and oriented x3, no acute distress  HEENT- Normocephalic atraumatic   pupils equal and reactive, EOMI.    TMs visualized and normal, ear canals normal.    Mouth moist with normal mucosa no ulceration, dentition normal or in good repair.  Neck- supple, no adenopathy or thyromegaly  Chest- Normal chest wall apperance, normal inspiration and expiration.  Clear to asculation.  CV- Regular rate and rhythm, normal tones, no murmus, gallops or rubs.  Abd-  Soft, nodistended, nontender.  Normal bowel sounds, no mass or organ enlargement.  Genitalia-  was not done  SUNNY: was not done  Ext- Atraumatic,  No synovial thickening. Good perfusion, no edema. Periph pulses detected  Skin- warm and dry, no rash  Neuro- Cranial nerves grossly intact.  Normal gait, normal strength.  Reflexes symmetric.  Coordination intact.    Diagnostics  Results for orders placed or performed in visit on 01/25/24   XR Chest 2 Views     Status: None    Narrative    EXAM: XR CHEST 2 VIEWS  LOCATION: [facility]  DATE: 1/25/2024    INDICATION:  History of asbestos exposure  COMPARISON: 11/24/2021      Impression    IMPRESSION: The cardiomediastinal silhouette and pulmonary vascularity are normal. Lungs are hyperinflated. No focal consolidation, pneumothorax nor pleural effusion.   Results for orders placed or performed in visit on 01/25/24   Glucose whole blood     Status: Abnormal   Result Value Ref Range    Glucose Whole Blood 170 (H) 60 - 99 mg/dL                     "

## 2024-01-25 NOTE — ASSESSMENT & PLAN NOTE
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  No breathing issues.  Requesting chest x-ray which I think is reasonable.  This was personally reviewed and appears normal.

## 2024-01-25 NOTE — ASSESSMENT & PLAN NOTE
Briefly explored today, understands he is dependent on marijuana and is felt better when he stopped it in the past.  Further discussion going forward.  Treat anxiety and depression.

## 2024-01-25 NOTE — ASSESSMENT & PLAN NOTE
Long-term issue, PHQ-9 today is 8.  Denies active suicidal ideation.  Anxiety seems more the problem.  Lexapro, referral to therapist.    Family history of addiction, not mental illness.  Current stressor of mom's failing health.

## 2024-01-25 NOTE — ASSESSMENT & PLAN NOTE
Patient reports to me today that he been told that he should have annual screenings.  Reviewing Dr. Diggs's note, no dysplasia noted.  Has been years since this was considered, referral back to [facility] for upper endoscopy.

## 2024-01-25 NOTE — ASSESSMENT & PLAN NOTE
Chronic issue, SULEMAN 7 is 14.  Please note SULEMAN is a provisional diagnosis, I have not reviewed diagnostic criteria.  Seems to have some OCD characteristics or at least skin picking.  Referral to therapist, start Lexapro, follow-up 1 month.

## 2024-01-29 LAB — HBA1C MFR BLD: 5.5 % (ref 0–5.6)

## 2024-03-07 ENCOUNTER — THERAPY VISIT (OUTPATIENT)
Dept: PHYSICAL THERAPY | Facility: REHABILITATION | Age: 49
End: 2024-03-07
Attending: FAMILY MEDICINE
Payer: COMMERCIAL

## 2024-03-07 DIAGNOSIS — M75.82 ROTATOR CUFF TENDONITIS, LEFT: ICD-10-CM

## 2024-03-07 PROCEDURE — 97110 THERAPEUTIC EXERCISES: CPT | Mod: GP

## 2024-03-07 PROCEDURE — 97161 PT EVAL LOW COMPLEX 20 MIN: CPT | Mod: GP

## 2024-03-07 NOTE — PROGRESS NOTES
PHYSICAL THERAPY EVALUATION  Type of Visit: Evaluation    See electronic medical record for Abuse and Falls Screening details.    Subjective       Presenting condition or subjective complaint:      6 month onset of left shoulder pain after straining while removing a locker. Was catching the locker as it fell.  Had 2 months of pretty intense pain in left shoulder with reaching forward and to the side. Has significantly reduced since onset, with only intermittent pain while donning shirt/jacket. Denies  L UE weakness, tingling or numbness. Right-hand dominant.     Date of onset: 09/01/23    Relevant medical history:     Past Medical History:   Diagnosis Date    Catherine's esophagus     Esophageal reflux     Gastropathy     GERD (gastroesophageal reflux disease)     Nausea with vomiting      Dates & types of surgery:  no    Prior diagnostic imaging/testing results:     none  Prior therapy history for the same diagnosis, illness or injury:    none    Prior Level of Function  Transfers:   Ambulation:   ADL:   IADL:     Living Environment  Social support:     Type of home:     Stairs to enter the home:         Ramp:     Stairs inside the home:         Help at home:    Equipment owned:       Employment:    [employer]   Hobbies/Interests:   body weight work out- push-up, sit-ups and squats, bicep curls    Patient goals for therapy:  talk over ROM exercises    Pain assessment:  pain at rest     Objective   SHOULDER EVALUATION  PAIN: Pain Level at Rest: 0/10  Pain Level with Use: 2/10  Pain Location: anterior left shoulder  Pain Quality: Shooting  Pain Frequency: intermittent  Pain is Worst: activity dependent  Pain is Exacerbated By: donning shirt  Pain is Relieved By: rest  Pain Progression: Improved    INTEGUMENTARY (edema, incisions):   POSTURE: Sitting Posture: Rounded shoulders, Thoracic kyphosis increased  GAIT:   Weightbearing Status:   Assistive Device(s):   Gait Deviations:   BALANCE/PROPRIOCEPTION:   WEIGHTBEARING  ALIGNMENT:   ROM:   (Degrees) Left AROM Left PROM Right AROM  Right PROM   Shoulder Flexion Full ROM  Full ROM    Shoulder Extension Full ROM  Full ROM    Shoulder Abduction Full ROM  Full ROM, no pain but sometimes pain at 90    Shoulder Adduction       Shoulder Internal Rotation Full ROM  Full ROM    Shoulder External Rotation Full ROM  Full ROM    Shoulder Horizontal Abduction       Shoulder Horizontal Adduction       Shoulder Flexion ER       Shoulder Flexion IR       Elbow Extension       Elbow Flexion       Pain:   End feel:     STRENGTH:   Pain: - none + mild ++ moderate +++ severe  Strength Scale: 0-5/5 Left Right   Shoulder Flexion 5 5   Shoulder Extension 5 5   Shoulder Abduction 4+, + (mild) 5   Shoulder Adduction     Shoulder Internal Rotation 5 5   Shoulder External Rotation 4+ 5   Shoulder Horizontal Abduction     Shoulder Horizontal Adduction     Elbow Flexion 5 5   Elbow Extension     Mid Trap 3+ 4-   Lower Trap 3+ 4-   Rhomboid 3+ 4-   Serratus Anterior       FLEXIBILITY:   SPECIAL TESTS:    Left Right   Impingement     Neer's Negative  Negative    Hawkin's-Colt Negative  Negative    Coracoid Impingement     Internal impingement     Labral     Anterior Slide     Parmer's     Crank     Instability     Apprehension (anterior)     Relocation (anterior)     Anterior Load & Shift     Posterior Load & Shift     Posterior instability (with 90 degrees flex)     Multi-Directional Instability      Sulcus     Biceps      Speed's     Rotator Cuff Tear     Drop Arm Negative  Negative    Belly Press Negative  Negative    Lift off  Negative  Negative      PALPATION:  tenderness to palpation along long head of biceps tendon  JOINT MOBILITY: WNL  CERVICAL SCREEN: WNL    Assessment & Plan   CLINICAL IMPRESSIONS  Medical Diagnosis: M75.82 (ICD-10-CM) - Rotator cuff tendonitis, left    Treatment Diagnosis: left shoulder pain   Impression/Assessment: Patient is a 48 year old male with left shoulder complaints.  The  following significant findings have been identified: Pain, Decreased strength, Impaired muscle performance, Decreased activity tolerance, and Impaired posture. These impairments interfere with their ability to perform self care tasks, work tasks, and recreational activities as compared to previous level of function.     Clinical Decision Making (Complexity):  Clinical Presentation: Stable/Uncomplicated  Clinical Presentation Rationale: based on medical and personal factors listed in PT evaluation  Clinical Decision Making (Complexity): Low complexity    PLAN OF CARE  Treatment Interventions:  Interventions: Manual Therapy, Neuromuscular Re-education, Therapeutic Activity, Therapeutic Exercise, Self-Care/Home Management    Long Term Goals     PT Goal 1  Goal Identifier: HEP  Goal Description: pt will demonstrate independence and report compliance with HEP to facilitate improved independent symptom mgmt.  Rationale: to maximize safety and independence with performance of ADLs and functional tasks;to maximize safety and independence with self cares  Target Date: 05/02/24  PT Goal 2  Goal Identifier: shoulder abduction  Goal Description: pt will report no pain with donning shirt/jacket to facilitate improved QOL.  Rationale: to maximize safety and independence with performance of ADLs and functional tasks  Target Date: 05/02/24      Frequency of Treatment: every other week  Duration of Treatment: up to 8 weeks    Recommended Referrals to Other Professionals:   Education Assessment:   Learner/Method: Patient    Risks and benefits of evaluation/treatment have been explained.   Patient/Family/caregiver agrees with Plan of Care.     Evaluation Time:     PT Eval, Low Complexity Minutes (92169): 25       Signing Clinician: Lyric Chowdhury PT

## 2024-04-11 ENCOUNTER — HOSPITAL ENCOUNTER (EMERGENCY)
Facility: HOSPITAL | Age: 49
Discharge: HOME | End: 2024-04-11
Attending: EMERGENCY MEDICINE | Admitting: EMERGENCY MEDICINE
Payer: COMMERCIAL

## 2024-04-11 ENCOUNTER — APPOINTMENT (OUTPATIENT)
Dept: CT IMAGING | Facility: HOSPITAL | Age: 49
End: 2024-04-11
Attending: EMERGENCY MEDICINE
Payer: COMMERCIAL

## 2024-04-11 VITALS
OXYGEN SATURATION: 97 % | TEMPERATURE: 97.7 F | WEIGHT: 137 LBS | HEART RATE: 63 BPM | DIASTOLIC BLOOD PRESSURE: 85 MMHG | RESPIRATION RATE: 16 BRPM | SYSTOLIC BLOOD PRESSURE: 124 MMHG | HEIGHT: 71 IN | BODY MASS INDEX: 19.18 KG/M2

## 2024-04-11 DIAGNOSIS — R11.2 NAUSEA AND VOMITING, UNSPECIFIED VOMITING TYPE: ICD-10-CM

## 2024-04-11 LAB
ALBUMIN SERPL BCG-MCNC: 5 G/DL (ref 3.5–5.2)
ALP SERPL-CCNC: 130 U/L (ref 40–150)
ALT SERPL W P-5'-P-CCNC: 30 U/L (ref 0–70)
ANION GAP SERPL CALCULATED.3IONS-SCNC: 19 MMOL/L (ref 7–15)
AST SERPL W P-5'-P-CCNC: 30 U/L (ref 0–45)
BASOPHILS # BLD AUTO: 0.1 10E3/UL (ref 0–0.2)
BASOPHILS NFR BLD AUTO: 0 %
BILIRUB SERPL-MCNC: 1.1 MG/DL
BUN SERPL-MCNC: 14 MG/DL (ref 6–20)
CALCIUM SERPL-MCNC: 10.1 MG/DL (ref 8.6–10)
CHLORIDE SERPL-SCNC: 97 MMOL/L (ref 98–107)
CREAT SERPL-MCNC: 0.88 MG/DL (ref 0.67–1.17)
DEPRECATED HCO3 PLAS-SCNC: 22 MMOL/L (ref 22–29)
EGFRCR SERPLBLD CKD-EPI 2021: >90 ML/MIN/1.73M2
EOSINOPHIL # BLD AUTO: 0 10E3/UL (ref 0–0.7)
EOSINOPHIL NFR BLD AUTO: 0 %
ERYTHROCYTE [DISTWIDTH] IN BLOOD BY AUTOMATED COUNT: 12.8 % (ref 10–15)
GLUCOSE SERPL-MCNC: 166 MG/DL (ref 70–99)
HCT VFR BLD AUTO: 45.2 % (ref 40–53)
HGB BLD-MCNC: 15.8 G/DL (ref 13.3–17.7)
HOLD SPECIMEN: NORMAL
HOLD SPECIMEN: NORMAL
IMM GRANULOCYTES # BLD: 0.1 10E3/UL
IMM GRANULOCYTES NFR BLD: 1 %
LIPASE SERPL-CCNC: 12 U/L (ref 13–60)
LYMPHOCYTES # BLD AUTO: 1.9 10E3/UL (ref 0.8–5.3)
LYMPHOCYTES NFR BLD AUTO: 11 %
MCH RBC QN AUTO: 29 PG (ref 26.5–33)
MCHC RBC AUTO-ENTMCNC: 35 G/DL (ref 31.5–36.5)
MCV RBC AUTO: 83 FL (ref 78–100)
MONOCYTES # BLD AUTO: 0.8 10E3/UL (ref 0–1.3)
MONOCYTES NFR BLD AUTO: 5 %
NEUTROPHILS # BLD AUTO: 13.7 10E3/UL (ref 1.6–8.3)
NEUTROPHILS NFR BLD AUTO: 83 %
NRBC # BLD AUTO: 0 10E3/UL
NRBC BLD AUTO-RTO: 0 /100
PLATELET # BLD AUTO: 370 10E3/UL (ref 150–450)
POTASSIUM SERPL-SCNC: 3.4 MMOL/L (ref 3.4–5.3)
PROT SERPL-MCNC: 8 G/DL (ref 6.4–8.3)
RBC # BLD AUTO: 5.44 10E6/UL (ref 4.4–5.9)
SODIUM SERPL-SCNC: 138 MMOL/L (ref 135–145)
WBC # BLD AUTO: 16.6 10E3/UL (ref 4–11)

## 2024-04-11 PROCEDURE — 258N000003 HC RX IP 258 OP 636: Performed by: EMERGENCY MEDICINE

## 2024-04-11 PROCEDURE — 96361 HYDRATE IV INFUSION ADD-ON: CPT

## 2024-04-11 PROCEDURE — 36415 COLL VENOUS BLD VENIPUNCTURE: CPT | Performed by: EMERGENCY MEDICINE

## 2024-04-11 PROCEDURE — 96374 THER/PROPH/DIAG INJ IV PUSH: CPT | Mod: 59

## 2024-04-11 PROCEDURE — 250N000011 HC RX IP 250 OP 636: Performed by: EMERGENCY MEDICINE

## 2024-04-11 PROCEDURE — 96375 TX/PRO/DX INJ NEW DRUG ADDON: CPT

## 2024-04-11 PROCEDURE — 80053 COMPREHEN METABOLIC PANEL: CPT | Performed by: EMERGENCY MEDICINE

## 2024-04-11 PROCEDURE — 99285 EMERGENCY DEPT VISIT HI MDM: CPT | Mod: 25

## 2024-04-11 PROCEDURE — 85025 COMPLETE CBC W/AUTO DIFF WBC: CPT | Performed by: EMERGENCY MEDICINE

## 2024-04-11 PROCEDURE — 83690 ASSAY OF LIPASE: CPT | Performed by: EMERGENCY MEDICINE

## 2024-04-11 PROCEDURE — 74177 CT ABD & PELVIS W/CONTRAST: CPT

## 2024-04-11 PROCEDURE — 96376 TX/PRO/DX INJ SAME DRUG ADON: CPT

## 2024-04-11 RX ORDER — MORPHINE SULFATE 4 MG/ML
4 INJECTION, SOLUTION INTRAMUSCULAR; INTRAVENOUS ONCE
Status: COMPLETED | OUTPATIENT
Start: 2024-04-11 | End: 2024-04-11

## 2024-04-11 RX ORDER — ONDANSETRON 4 MG/1
4 TABLET, ORALLY DISINTEGRATING ORAL EVERY 8 HOURS PRN
Qty: 12 TABLET | Refills: 0 | Status: SHIPPED | OUTPATIENT
Start: 2024-04-11 | End: 2024-04-15

## 2024-04-11 RX ORDER — ONDANSETRON 2 MG/ML
4 INJECTION INTRAMUSCULAR; INTRAVENOUS ONCE
Status: COMPLETED | OUTPATIENT
Start: 2024-04-11 | End: 2024-04-11

## 2024-04-11 RX ORDER — IOPAMIDOL 755 MG/ML
67 INJECTION, SOLUTION INTRAVASCULAR ONCE
Status: COMPLETED | OUTPATIENT
Start: 2024-04-11 | End: 2024-04-11

## 2024-04-11 RX ADMIN — MORPHINE SULFATE 4 MG: 4 INJECTION, SOLUTION INTRAMUSCULAR; INTRAVENOUS at 13:28

## 2024-04-11 RX ADMIN — IOPAMIDOL 67 ML: 755 INJECTION, SOLUTION INTRAVENOUS at 14:14

## 2024-04-11 RX ADMIN — ONDANSETRON 4 MG: 2 INJECTION INTRAMUSCULAR; INTRAVENOUS at 13:13

## 2024-04-11 RX ADMIN — SODIUM CHLORIDE 1000 ML: 9 INJECTION, SOLUTION INTRAVENOUS at 14:46

## 2024-04-11 RX ADMIN — ONDANSETRON 4 MG: 2 INJECTION INTRAMUSCULAR; INTRAVENOUS at 14:46

## 2024-04-11 RX ADMIN — SODIUM CHLORIDE 1000 ML: 9 INJECTION, SOLUTION INTRAVENOUS at 13:11

## 2024-04-11 ASSESSMENT — COLUMBIA-SUICIDE SEVERITY RATING SCALE - C-SSRS
6. HAVE YOU EVER DONE ANYTHING, STARTED TO DO ANYTHING, OR PREPARED TO DO ANYTHING TO END YOUR LIFE?: NO
1. IN THE PAST MONTH, HAVE YOU WISHED YOU WERE DEAD OR WISHED YOU COULD GO TO SLEEP AND NOT WAKE UP?: NO
2. HAVE YOU ACTUALLY HAD ANY THOUGHTS OF KILLING YOURSELF IN THE PAST MONTH?: NO

## 2024-04-11 ASSESSMENT — ACTIVITIES OF DAILY LIVING (ADL)
ADLS_ACUITY_SCORE: 35
ADLS_ACUITY_SCORE: 33

## 2024-04-11 NOTE — ED PROVIDER NOTES
EMERGENCY DEPARTMENT ENCOUNTER      NAME: José Manuel Toussaint  AGE: 48 year old male  YOB: 1975  MRN: [id number]  EVALUATION DATE & TIME: 2024 12:56 PM    PCP: Roscoe Rodríguez    ED PROVIDER: Humberto Simmons D.O.      Chief Complaint   Patient presents with    Nausea & Vomiting    Dehydration       FINAL IMPRESSION:  1. Nausea and vomiting, unspecified vomiting type        ED COURSE & MEDICAL DECISION MAKIN:12 PM I met with the patient to gather history and to perform my initial exam. I discussed the plan for care while in the Emergency Department.    ED Course as of 24 1520   Thu 2024   1518 Patient feels significantly improved and is requesting discharge.       Pertinent Labs & Imaging studies reviewed. (See chart for details)  48 year old male presents to the Emergency Department for evaluation of nausea and vomiting without diarrhea.  Patient had minimal abdominal discomfort on my exam, but it was quite diffuse in nature.  With the severity of the vomiting, I did have some concern for potential for bowel obstruction, the differential would also include gastritis, colitis, volvulus, other acute process.  Clinically did not believe this to represent cholecystitis, cholelithiasis, or choledocholithiasis he had no focalized right upper quadrant tenderness.  Labs of the patient were largely unremarkable.  CT imaging did not show any evidence of acute process that could these explain his symptoms.  Patient does report a history of similar symptoms in the past, today his symptoms did improve dramatically with IV fluids, Zofran, morphine.  I am somewhat suspicious that symptoms could be secondary to his marijuana use, though it does appear to be this is quite infrequent despite his frequent use of marijuana, therefore not definitively believe this to be the case though it is still possible.  At this time, the patient is stable, feeling much better, and is comfortable with discharge.   Will have him follow-up as an outpatient with his primary care provider.  Return precautions were discussed.    Medical Decision Making  Obtained supplemental history:Supplemental history obtained?: Documented in chart  Reviewed external records: External records reviewed?: Documented in chart  Care impacted by chronic illness:Other: Marijuana smoker   Care significantly affected by social determinants of health:N/A  Did you consider but not order tests?: Work up considered but not performed and documented in chart, if applicable  Did you interpret images independently?: Independent interpretation of ECG and images noted in documentation, when applicable.  Consultation discussion with other provider:Did you involve another provider (consultant, , pharmacy, etc.)?: No  Discharge. I prescribed additional prescription strength medication(s) as charted. I considered admission, but discharged patient after significant clinical improvement.    At the conclusion of the encounter I discussed the results of all of the tests and the disposition. The questions were answered. The patient or family acknowledged understanding and was agreeable with the care plan.        HPI    Patient information was obtained from: Patient     Use of : N/A     José Manuel Toussaint is a 48 year old male who presents to the ED by walk in for evaluation of nausea, vomiting, and dehydration.     The patient reports having an episode of vomiting nonstop has been an ongoing issue that happens every couple of years. When this episode appears, he would go to the ED and receive IV fluids, morphine, and a heavy dosage of Zofran that helps him feel better. He explains these episodes usually happen when he eats something. Today, he had an episode and felt dehydrated, shaky, and has abdominal pain. His last episode was 3 years ago.     Patient is a marijuana smoker who smokes on a daily and is on anti-anxiety medication.         REVIEW OF  SYSTEMS  Constitutional:  Denies fever, chills, weight loss or weakness, nervous, and cachectic.   Eyes:  No pain, discharge, redness  HENT:  Denies sore throat, ear pain, congestion  Respiratory: No SOB, wheeze or cough  Cardiovascular:  No CP, palpitations  GI:  Denies nausea and diarrhea. Patient has abdominal pain and has vomited.   : Denies dysuria, hematuria. Mild diffuse abdominal tenderness.   Musculoskeletal:  Denies any new muscle/joint pain, swelling or loss of function.  Skin:  Denies rash, pallor  Neurologic:  Denies headache, focal weakness or sensory changes  Lymph: Denies swollen nodes    All other systems negative unless noted in HPI.    PAST MEDICAL HISTORY:  Past Medical History:   Diagnosis Date    Catherine's esophagus     Esophageal reflux     Gastropathy     GERD (gastroesophageal reflux disease)     Nausea with vomiting        PAST SURGICAL HISTORY:  Past Surgical History:   Procedure Laterality Date     KNEE SCOPE, DIAGNOSTIC      Description: Arthroscopy Knee;  Recorded: 11/19/2007;    [facility] APPENDECTOMY      Description: Appendectomy;  Recorded: 02/20/2009;    [facility] REPAIR UMBILICAL PATEL,<4Y/O,REDUC      Description: Umbilical Hernia Repair;  Recorded: 03/15/2012;         CURRENT MEDICATIONS:    No current facility-administered medications for this encounter.     Current Outpatient Medications   Medication Sig Dispense Refill    ondansetron (ZOFRAN ODT) 4 MG ODT tab Take 1 tablet (4 mg) by mouth every 8 hours as needed 12 tablet 0         ALLERGIES:  Allergies   Allergen Reactions    Metoclopramide Hcl [Metoclopramide] Unknown     Tremor      Nsaids     Prochlorperazine Edisylate [Prochlorperazine] Unknown     Tremor         FAMILY HISTORY:  History reviewed. No pertinent family history.    SOCIAL HISTORY:  Social History     Socioeconomic History    Marital status: Single   Tobacco Use    Smoking status: Never     Passive exposure: Never    Smokeless tobacco: Never   Vaping Use    Vaping  "status: Never Used   Substance and Sexual Activity    Alcohol use: Not Currently    Drug use: Yes     Types: Marijuana     Comment: Drug use: 1 gram per day     Social Determinants of Health     Financial Resource Strain: Low Risk  (1/25/2024)    Financial Resource Strain     Within the past 12 months, have you or your family members you live with been unable to get utilities (heat, electricity) when it was really needed?: No   Food Insecurity: Low Risk  (1/25/2024)    Food Insecurity     Within the past 12 months, did you worry that your food would run out before you got money to buy more?: No     Within the past 12 months, did the food you bought just not last and you didn t have money to get more?: Patient declined   Transportation Needs: Low Risk  (1/25/2024)    Transportation Needs     Within the past 12 months, has lack of transportation kept you from medical appointments, getting your medicines, non-medical meetings or appointments, work, or from getting things that you need?: No   Interpersonal Safety: Low Risk  (1/25/2024)    Interpersonal Safety     Do you feel physically and emotionally safe where you currently live?: Yes     Within the past 12 months, have you been hit, slapped, kicked or otherwise physically hurt by someone?: No     Within the past 12 months, have you been humiliated or emotionally abused in other ways by your partner or ex-partner?: No   Housing Stability: High Risk (1/25/2024)    Housing Stability     Do you have housing? : Yes     Are you worried about losing your housing?: Yes       VITALS:  Patient Vitals for the past 24 hrs:   BP Temp Temp src Pulse Resp SpO2 Height Weight   04/11/24 1500 124/85 -- -- 63 -- 97 % -- --   04/11/24 1416 117/78 -- -- 65 -- 100 % -- --   04/11/24 1358 124/67 -- -- -- -- -- -- --   04/11/24 1345 131/80 -- -- -- -- -- -- --   04/11/24 1251 124/87 97.7  F (36.5  C) Oral 75 16 93 % 1.803 m (5' 11\") 62.1 kg (137 lb)       PHYSICAL EXAM    VITAL SIGNS: BP " "124/85   Pulse 63   Temp 97.7  F (36.5  C) (Oral)   Resp 16   Ht 1.803 m (5' 11\")   Wt 62.1 kg (137 lb)   SpO2 97%   BMI 19.11 kg/m      General Appearance: Well-appearing, well-nourished, no acute distress. Nervous and cachectic.   Head:  Normocephalic, without obvious abnormality, atraumatic  Eyes:  PERRL, conjunctiva/corneas clear, EOM's intact,  ENT:  Lips, mucosa, and tongue normal, membranes are moist without pallor  Neck:  Normal ROM, symmetrical, trachea midline    Chest:  No tenderness or deformity, no crepitus  Cardio:  Regular rate and rhythm, no murmur, rub or gallop, 2+ pulses symmetric in all extremities  Pulm:  Clear to auscultation bilaterally, respirations unlabored,  Back:  ROM normal, no CVA tenderness, no spinal tenderness, no paraspinal tenderness  Abdomen:  Soft, no rebound or guarding. Mild diffuse abdominal tenderness.   Musculoskeletal: Full ROM, no edema, no cyanosis, good ROM of major joints  Integument:  Warm, Dry, No erythema, No rash.    Neurologic:  Alert & oriented.  No focal deficits appreciated.  Ambulatory.  Psychiatric:  Affect normal, Judgment normal, Mood normal.      LABS  Results for orders placed or performed during the hospital encounter of 04/11/24 (from the past 24 hour(s))   [place] Draw *Canceled*    Narrative    The following orders were created for panel order [place] Draw.  Procedure                               Abnormality         Status                     ---------                               -----------         ------                       Please view results for these tests on the individual orders.   [place] Draw *Canceled*    Narrative    The following orders were created for panel order [place] Draw.  Procedure                               Abnormality         Status                     ---------                               -----------         ------                       Please view results for these tests on the individual orders.   CBC with " Platelets & Differential    Narrative    The following orders were created for panel order CBC with Platelets & Differential.  Procedure                               Abnormality         Status                     ---------                               -----------         ------                     CBC with platelets and d...[[id number]]  Abnormal            Final result                 Please view results for these tests on the individual orders.   Comprehensive metabolic panel   Result Value Ref Range    Sodium 138 135 - 145 mmol/L    Potassium 3.4 3.4 - 5.3 mmol/L    Carbon Dioxide (CO2) 22 22 - 29 mmol/L    Anion Gap 19 (H) 7 - 15 mmol/L    Urea Nitrogen 14.0 6.0 - 20.0 mg/dL    Creatinine 0.88 0.67 - 1.17 mg/dL    GFR Estimate >90 >60 mL/min/1.73m2    Calcium 10.1 (H) 8.6 - 10.0 mg/dL    Chloride 97 (L) 98 - 107 mmol/L    Glucose 166 (H) 70 - 99 mg/dL    Alkaline Phosphatase 130 40 - 150 U/L    AST 30 0 - 45 U/L    ALT 30 0 - 70 U/L    Protein Total 8.0 6.4 - 8.3 g/dL    Albumin 5.0 3.5 - 5.2 g/dL    Bilirubin Total 1.1 <=1.2 mg/dL   [place] Draw    Narrative    The following orders were created for panel order [place] Draw.  Procedure                               Abnormality         Status                     ---------                               -----------         ------                     Extra Blue Top Tube[[id number]]                              Final result               Extra Red Top Tube[[id number]]                               Final result                 Please view results for these tests on the individual orders.   CBC with platelets and differential   Result Value Ref Range    WBC Count 16.6 (H) 4.0 - 11.0 10e3/uL    RBC Count 5.44 4.40 - 5.90 10e6/uL    Hemoglobin 15.8 13.3 - 17.7 g/dL    Hematocrit 45.2 40.0 - 53.0 %    MCV 83 78 - 100 fL    MCH 29.0 26.5 - 33.0 pg    MCHC 35.0 31.5 - 36.5 g/dL    RDW 12.8 10.0 - 15.0 %    Platelet Count 370 150 - 450 10e3/uL    % Neutrophils 83 %    %  Lymphocytes 11 %    % Monocytes 5 %    % Eosinophils 0 %    % Basophils 0 %    % Immature Granulocytes 1 %    NRBCs per 100 WBC 0 <1 /100    Absolute Neutrophils 13.7 (H) 1.6 - 8.3 10e3/uL    Absolute Lymphocytes 1.9 0.8 - 5.3 10e3/uL    Absolute Monocytes 0.8 0.0 - 1.3 10e3/uL    Absolute Eosinophils 0.0 0.0 - 0.7 10e3/uL    Absolute Basophils 0.1 0.0 - 0.2 10e3/uL    Absolute Immature Granulocytes 0.1 <=0.4 10e3/uL    Absolute NRBCs 0.0 10e3/uL   Extra Blue Top Tube   Result Value Ref Range    Hold Specimen JIC    Extra Red Top Tube   Result Value Ref Range    Hold Specimen JIC    Lipase   Result Value Ref Range    Lipase 12 (L) 13 - 60 U/L   CT Abdomen Pelvis w Contrast    Narrative    EXAM: CT ABDOMEN PELVIS W CONTRAST  LOCATION: [facility]  DATE: 4/11/2024    INDICATION: Diffuse abdominal pain  COMPARISON: 10/30/2015.  TECHNIQUE: CT scan of the abdomen and pelvis was performed following injection of IV contrast. Multiplanar reformats were obtained. Dose reduction techniques were used.  CONTRAST: C6-C7 ML Isovue-370 intravenously.    FINDINGS:   LOWER CHEST: Mild atelectasis in the left lower lobe.    HEPATOBILIARY: No significant mass or bile duct dilatation. No calcified gallstones.     PANCREAS: Normal.    SPLEEN: Normal.    ADRENAL GLANDS: Normal.    KIDNEYS/BLADDER: Normal.    BOWEL: There are a few colonic diverticula without evidence for diverticulitis no bowel obstruction or free intraperitoneal air. No focal inflammatory process involving the bowel.    LYMPH NODES: No enlarged lymph nodes.    VASCULATURE: Normal.    PELVIC ORGANS: Normal.    MUSCULOSKELETAL: Disc space narrowing with vacuum phenomenon at L5-S1 consistent with degenerative disc disease. Mild facet hypertrophy on the left at L5-S1. No acute fracture or suspicious bony lesion.      Impression    IMPRESSION:   1.  A few colonic diverticula without evidence for diverticulitis, bowel obstruction, abscess or focal  inflammatory process involving the bowel.  2.  No calcified gallstones hydronephrosis or renal or ureteral stone. No abdominal aortic aneurysm.  3.  No abnormal masses or lymphadenopathy.  4.  Degenerative disc disease L5-S1.  5.  Mild atelectasis left lung base.         RADIOLOGY  CT Abdomen Pelvis w Contrast   Final Result   IMPRESSION:    1.  A few colonic diverticula without evidence for diverticulitis, bowel obstruction, abscess or focal inflammatory process involving the bowel.   2.  No calcified gallstones hydronephrosis or renal or ureteral stone. No abdominal aortic aneurysm.   3.  No abnormal masses or lymphadenopathy.   4.  Degenerative disc disease L5-S1.   5.  Mild atelectasis left lung base.                 MEDICATIONS GIVEN IN THE EMERGENCY:  Medications   ondansetron (ZOFRAN) injection 4 mg (4 mg Intravenous $Given 4/11/24 1313)   sodium chloride 0.9% BOLUS 1,000 mL (0 mLs Intravenous Stopped 4/11/24 1446)   morphine (PF) injection 4 mg (4 mg Intravenous $Given 4/11/24 1328)   iopamidol (ISOVUE-370) solution 67 mL (67 mLs Intravenous $Given 4/11/24 1414)   ondansetron (ZOFRAN) injection 4 mg (4 mg Intravenous $Given 4/11/24 1446)   sodium chloride 0.9% BOLUS 1,000 mL (1,000 mLs Intravenous $New Bag 4/11/24 1446)       NEW PRESCRIPTIONS STARTED AT TODAY'S ER VISIT  New Prescriptions    ONDANSETRON (ZOFRAN ODT) 4 MG ODT TAB    Take 1 tablet (4 mg) by mouth every 8 hours as needed        I, Sherie Daniel, am serving as a scribe to document services personally performed by Humberto Simmons D.O., based on my observations and the provider's statements to me.  I, Humberto Simmons D.O., attest that Sherie Daniel is acting in a scribe capacity, has observed my performance of the services and has documented them in accordance with my direction.     Humberto Simmons D.O.  Emergency Medicine  [facility] EMERGENCY DEPARTMENT  [address] [place] [id number]  [phone]  Dept:  [phone]       Humberto Simmons,   04/11/24 1906

## 2024-04-11 NOTE — ED TRIAGE NOTES
Patient c/o nausea, vomiting and feeling dehydrated.  Patient has hx: Catherine's esophagus.  Patient takes Prilosec.  Patient stated drank too much caffeine . Patient not drinking water.      Triage Assessment (Adult)       Row Name 04/11/24 125          Triage Assessment    Airway WDL WDL        Respiratory WDL    Respiratory WDL WDL        Skin Circulation/Temperature WDL    Skin Circulation/Temperature WDL WDL        Cardiac WDL    Cardiac WDL WDL        Peripheral/Neurovascular WDL    Peripheral Neurovascular WDL WDL        Cognitive/Neuro/Behavioral WDL    Cognitive/Neuro/Behavioral WDL WDL

## 2024-04-14 DIAGNOSIS — R11.2 NAUSEA AND VOMITING, UNSPECIFIED VOMITING TYPE: Primary | ICD-10-CM

## 2024-04-15 RX ORDER — ONDANSETRON 4 MG/1
TABLET, ORALLY DISINTEGRATING ORAL
Qty: 12 TABLET | Refills: 0 | Status: SHIPPED | OUTPATIENT
Start: 2024-04-15

## 2024-04-15 RX ORDER — ONDANSETRON 4 MG/1
4 TABLET, ORALLY DISINTEGRATING ORAL EVERY 8 HOURS PRN
Qty: 12 TABLET | Refills: 0 | OUTPATIENT
Start: 2024-04-15

## 2024-04-15 NOTE — TELEPHONE ENCOUNTER
Medication Question or Refill        What medication are you calling about (include dose and sig)?: ondansetron (ZOFRAN ODT) 4 MG ODT tab     Preferred Pharmacy:  [facility] - [place], [place] - [address]  [place] [address]  Phone: [phone] Fax: [phone]      Controlled Substance Agreement on file:   CSA -- Patient Level:    CSA: None found at the patient level.       Who prescribed the medication?: PCP    Do you need a refill? Yes, pt called in to request a refill on medication he was giving at the hospital and  was told to contact his PCP for a refill. Pt states he is down to 2 tablets.  Please look into this request and advise.  Thanks!    When did you use the medication last? N/A    Patient offered an appointment? No    Do you have any questions or concerns?  Yes: pt states he would like a call back from PCP regarding some concerns that he has. Please advise.  Thanks!      Could we send this information to you in [employer] or would you prefer to receive a phone call?:   Patient would prefer a phone call   Okay to leave a detailed message?: Yes at Home number on file [phone] (home)

## 2024-08-14 ENCOUNTER — ANCILLARY PROCEDURE (OUTPATIENT)
Dept: GENERAL RADIOLOGY | Facility: CLINIC | Age: 49
End: 2024-08-14
Attending: NURSE PRACTITIONER
Payer: COMMERCIAL

## 2024-08-14 ENCOUNTER — OFFICE VISIT (OUTPATIENT)
Dept: URGENT CARE | Facility: URGENT CARE | Age: 49
End: 2024-08-14
Payer: COMMERCIAL

## 2024-08-14 VITALS
DIASTOLIC BLOOD PRESSURE: 72 MMHG | OXYGEN SATURATION: 97 % | HEART RATE: 68 BPM | SYSTOLIC BLOOD PRESSURE: 116 MMHG | TEMPERATURE: 98 F

## 2024-08-14 DIAGNOSIS — S69.91XA HAND INJURY, RIGHT, INITIAL ENCOUNTER: ICD-10-CM

## 2024-08-14 DIAGNOSIS — S62.344A CLOSED NONDISPLACED FRACTURE OF BASE OF FOURTH METACARPAL BONE OF RIGHT HAND, INITIAL ENCOUNTER: Primary | ICD-10-CM

## 2024-08-14 PROCEDURE — 99214 OFFICE O/P EST MOD 30 MIN: CPT | Mod: 25 | Performed by: NURSE PRACTITIONER

## 2024-08-14 PROCEDURE — 73130 X-RAY EXAM OF HAND: CPT | Mod: RT | Performed by: STUDENT IN AN ORGANIZED HEALTH CARE EDUCATION/TRAINING PROGRAM

## 2024-08-14 RX ORDER — TRAMADOL HYDROCHLORIDE 50 MG/1
50 TABLET ORAL EVERY 6 HOURS PRN
Qty: 5 TABLET | Refills: 0 | Status: SHIPPED | OUTPATIENT
Start: 2024-08-14

## 2024-08-14 NOTE — PATIENT INSTRUCTIONS
Fourth metacarpal fracture splinted in clinic  Rotate Tylenol ibuprofen  Rest ice compression elevate  Ortho hand for follow-up  Immediate reevaluation if rapid worsening pain numbness tingling pallor cool sensation pulselessness

## 2024-08-14 NOTE — PROGRESS NOTES
Chief Complaint   Patient presents with    Urgent Care     - Today  - Was walking do and fell onto right hand  - Felt pop in his knuckle and wrist   - Experiencing pain (worse with movement)  - Here today to rule out fx     SUBJECTIVE:  José Manuel Toussaint is a 49 year old male presenting with a right hand injury.  He has 4th 5th metacarpal MCP joint pain edema bruising following fall on concrete about 2 hours ago.  Initial numbness tingling which has improved.  He was walking his dog and the leash was caught in his hand when the dog pulled and smashed his hand into the curb.  Denies pallor cool sensation pulselessness last range of motion.  He is a reynoso.    Past Medical History:   Diagnosis Date    Catherine's esophagus     Esophageal reflux     Gastropathy     GERD (gastroesophageal reflux disease)     Nausea with vomiting      Current Outpatient Medications   Medication Sig Dispense Refill    ondansetron (ZOFRAN ODT) 4 MG ODT tab DISSOLVE 1 TABLET (4 MG) BY MOUTH EVERY 8 HOURS AS NEEDED (Patient not taking: Reported on 8/14/2024) 12 tablet 0     No current facility-administered medications for this visit.     Social History     Tobacco Use    Smoking status: Never     Passive exposure: Never    Smokeless tobacco: Never   Substance Use Topics    Alcohol use: Not Currently     Allergies   Allergen Reactions    Metoclopramide Hcl [Metoclopramide] Unknown     Tremor      Nsaids     Prochlorperazine Edisylate [Prochlorperazine] Unknown     Tremor         Review of Systems  All systems negative except those listed above in HPI.    OBJECTIVE:   /72   Pulse 68   Temp 98  F (36.7  C) (Temporal)   SpO2 97%     Physical Exam  Vitals reviewed.   Constitutional:       Appearance: Normal appearance.   HENT:      Head: Normocephalic and atraumatic.      Nose: Nose normal.      Mouth/Throat:      Mouth: Mucous membranes are moist.      Pharynx: Oropharynx is clear.   Eyes:      Extraocular Movements: Extraocular  movements intact.      Conjunctiva/sclera: Conjunctivae normal.      Pupils: Pupils are equal, round, and reactive to light.   Cardiovascular:      Rate and Rhythm: Normal rate.      Pulses: Normal pulses.   Pulmonary:      Effort: Pulmonary effort is normal.   Musculoskeletal:         General: Swelling, tenderness and signs of injury present. Normal range of motion.      Cervical back: Normal range of motion and neck supple.   Skin:     General: Skin is warm and dry.      Findings: Bruising present. No rash.   Neurological:      General: No focal deficit present.      Mental Status: He is alert and oriented to person, place, and time.      Sensory: No sensory deficit.   Psychiatric:         Mood and Affect: Mood normal.         Behavior: Behavior normal.       X-ray done in clinic read by me as positive for fourth metacarpal fracture.    ASSESSMENT:    ICD-10-CM    1. Closed nondisplaced fracture of base of fourth metacarpal bone of right hand, initial encounter  S62.344A Orthopedic  Referral     APPLY SHORT ARM SPLINT STATIC      2. Hand injury, right, initial encounter  S69.91XA XR Hand Right G/E 3 Views        PLAN:     Procedure: Preformed plastic foam volar splint applied with stockinette and ace wraps. Patient tolerated well.    Fourth metacarpal fracture splinted in clinic  Rotate Tylenol ibuprofen  Rest ice compression elevate  Ortho hand for follow-up  Immediate reevaluation if rapid worsening pain numbness tingling pallor cool sensation pulselessness positive for fourth metacarpal fracture.    Follow up with primary care provider with any problems, questions or concerns or if symptoms worsen or fail to improve. Patient agreed to plan and verbalized understanding.    Gayla Sidhu, DAVID-BC  [facility]

## 2024-08-16 ENCOUNTER — TELEPHONE (OUTPATIENT)
Dept: URGENT CARE | Facility: URGENT CARE | Age: 49
End: 2024-08-16
Payer: COMMERCIAL

## 2024-08-16 NOTE — TELEPHONE ENCOUNTER
Other: Patient has been referred for 4th metacarpal fracture, Gayla Emerson, NP. Patient's DOI is 8/14/24 and patient is in splint. Please call patient for scheduling.      Could we send this information to you in [facility] or would you prefer to receive a phone call?:   Patient would prefer a phone call   Okay to leave a detailed message?: Yes at Cell number on file:    Telephone Information:   Mobile [phone]

## 2024-08-16 NOTE — TELEPHONE ENCOUNTER
Patient confirmed scheduled appointment:  Date: 8/20/24  Time: 9:00am nithin PASCUAL  Visit type: New MSK  Provider: Dr. Huynh

## 2024-08-16 NOTE — TELEPHONE ENCOUNTER
Orthopedic/Sports Medicine Fracture Triage    Incoming call/or message from call center member.    Fracture type: Hand.    The patient is in a  splint.    Date of injury 8/14/24    Triaged by: Dr. Francois .    Determined to be managed Non operatively.    Needs to be seen in 1-2 weeks.    Additional Comments/information: Will be routed to sports for scheduling     Alva Martin ATC

## 2024-08-16 NOTE — TELEPHONE ENCOUNTER
DIAGNOSIS: 4th metacarpal fracture, Gayla Emerson, NP. Patient's DOI is 8/14/24 and patient is in splint. Ok to schedule per Meena PASCUAL     APPOINTMENT DATE: 8.20.24   NOTES STATUS DETAILS   DISCHARGE REPORT from the ER Internal 8.14.24  Ki     MEDICATION LIST Internal    XRAYS (IMAGES & REPORTS) Internal 8.14.24  XR Hand Right

## 2024-08-20 ENCOUNTER — TELEPHONE (OUTPATIENT)
Dept: ORTHOPEDICS | Facility: CLINIC | Age: 49
End: 2024-08-20

## 2024-08-20 ENCOUNTER — OFFICE VISIT (OUTPATIENT)
Dept: ORTHOPEDICS | Facility: CLINIC | Age: 49
End: 2024-08-20
Payer: COMMERCIAL

## 2024-08-20 ENCOUNTER — PRE VISIT (OUTPATIENT)
Dept: ORTHOPEDICS | Facility: CLINIC | Age: 49
End: 2024-08-20

## 2024-08-20 ENCOUNTER — ANCILLARY PROCEDURE (OUTPATIENT)
Dept: GENERAL RADIOLOGY | Facility: CLINIC | Age: 49
End: 2024-08-20
Attending: STUDENT IN AN ORGANIZED HEALTH CARE EDUCATION/TRAINING PROGRAM
Payer: COMMERCIAL

## 2024-08-20 DIAGNOSIS — M79.641 RIGHT HAND PAIN: Primary | ICD-10-CM

## 2024-08-20 DIAGNOSIS — M25.571 PAIN IN JOINT INVOLVING RIGHT ANKLE AND FOOT: ICD-10-CM

## 2024-08-20 DIAGNOSIS — S62.344D CLOSED NONDISPLACED FRACTURE OF BASE OF FOURTH METACARPAL BONE OF RIGHT HAND WITH ROUTINE HEALING, SUBSEQUENT ENCOUNTER: Primary | ICD-10-CM

## 2024-08-20 PROCEDURE — 73130 X-RAY EXAM OF HAND: CPT | Mod: RT | Performed by: RADIOLOGY

## 2024-08-20 PROCEDURE — 29075 APPL CST ELBW FNGR SHORT ARM: CPT | Mod: RT | Performed by: STUDENT IN AN ORGANIZED HEALTH CARE EDUCATION/TRAINING PROGRAM

## 2024-08-20 PROCEDURE — 99203 OFFICE O/P NEW LOW 30 MIN: CPT | Mod: 25 | Performed by: STUDENT IN AN ORGANIZED HEALTH CARE EDUCATION/TRAINING PROGRAM

## 2024-08-20 NOTE — PROGRESS NOTES
Sports Medicine Clinic           ASSESSMENT and PLAN:     Kishan was seen today for pain.    Diagnoses and all orders for this visit:    Closed nondisplaced fracture of base of fourth metacarpal bone of right hand with routine healing, subsequent encounter  Small fracture at the base of the fourth metacarpal, although not read on original x-rays or repeat x-rays today by the radiologist, there is a small fracture line which is consistent with the patient's exam.  Given this we will immobilize with a short arm cast as well as liza tape the fourth and fifth digits to try to allow patient to have the most dexterity possible while still mobilizing the fracture area.  We did discuss that if he has any significant pain with this type of casting, that he should return for ulnar gutter cast.  -     Cast/splint application  -     Follow-up in 2 to 3 weeks    Pain in joint, ankle and foot  And the end of the visit patient did mention he has been having some pain in his foot that he thinks caused his fall and has a lump within his foot that he would like to have further evaluated.  He will follow-up for this specifically.  -     Sports Med Adult Follow-Up Clinic Order (Blank)      Return sooner if develops new or worsening symptoms.    Options for treatment and/or follow-up care were reviewed with the patient was actively involved in the decision making process. Patient verbalized understanding and was in agreement with the plan.    Emily Huynh MD, [facility]  Primary Care Sports Medicine           SUBJECTIVE       José Manuel Toussaint is a 49 year old male presenting to clinic today with a chief complaint of right hand injury, referred by .    Onset: 8/14/24. Patient describes injury as coming down on his right foot and fell onto the right hand.  Location of Pain: right 4th metacarpal   Rating of Pain at worst: 7/10  Rating of Pain Currently: 5/10  Worsened by: soreness, movement   Better with: splint  Treatments tried: splint,  tylenol and oxycodone   Associated symptoms: no distal numbness or tingling; denies swelling or warmth  Orthopedic history: NO  Relevant surgical history: NO  Social history: social history: works as a re[employer]     Pain has been tolerable, but he has been doing okay.    He has been working the whole time. 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 No numbness or tingling.  Slightly sore when he moves his fingers but no sharp pain.      PMH, Medications and Allergies were reviewed and updated as needed.    ROS:  As noted above otherwise negative.    Patient Active Problem List   Diagnosis    Esophageal reflux    Catherine's esophagus    Nausea with vomiting    Closed Fracture Of The Fibula    Gastropathy    SYLVIA (acute kidney injury) (H24)    Dysphagia    Healthcare maintenance    Generalized anxiety disorder    Depression, unspecified depression type    Marijuana smoker, continuous    Rotator cuff tendonitis, left    History of asbestos exposure    Tinnitus, bilateral       Current Outpatient Medications   Medication Sig Dispense Refill    ondansetron (ZOFRAN ODT) 4 MG ODT tab DISSOLVE 1 TABLET (4 MG) BY MOUTH EVERY 8 HOURS AS NEEDED (Patient not taking: Reported on 8/14/2024) 12 tablet 0    traMADol (ULTRAM) 50 MG tablet Take 1 tablet (50 mg) by mouth every 6 hours as needed for severe pain 5 tablet 0            OBJECTIVE:       Vitals: There were no vitals filed for this visit.  BMI: There is no height or weight on file to calculate BMI.    Gen:  Well nourished and in no acute distress  HEENT: Extraocular movement intact  Neck: Supple  Pulm:  Breathing Comfortably. No increased respiratory effort.  Psych: Euthymic. Appropriately answers questions    MSK:   RIGHT HAND  Inspection:    No obvious deformity or asymmetry, well swelling but ecchymosis over the base of the fourth and fifth metacarpals  Palpation:   Carpals: normal   Metacarpals: 4th metacarpal tender to light palpation   Thumb:  normal   Fingers: normal  Range of Motion:  Full to gently range the wrist and the fingers without significant pain  Strength:   strength intact    Imaging was personally reviewed and interpreted by me.   XRAY right hand (8/20/2024): Small, nondisplaced fracture at the base of the fourth metacarpal, no significant change from previous      Cast/splint application    Date/Time: 8/20/2024 9:40 AM    Performed by: Paz Cason ATC  Authorized by: Emily Huynh MD    Consent:     Consent obtained:  Verbal    Consent given by:  Patient    Risks discussed:  Discoloration, numbness, swelling and pain    Alternatives discussed:  No treatment  Pre-procedure details:     Sensation:  Normal  Procedure details:     Laterality:  Right    Location:  Hand    Hand:  R hand    Cast type:  Short arm    Supplies:  Fiberglass  Post-procedure details:     Pain:  Improved    Pain level:  0/10    Sensation:  Normal    Patient tolerance of procedure:  Tolerated well, no immediate complications    Patient provided with cast or splint care instructions: Yes    Comments:      Patient had a scab on upper forearm with bandage per  it was okay to do a shorter short arm. Patient also had a wet blister on palm of hand from previous splint. Okay to leave as it per .

## 2024-08-20 NOTE — LETTER
8/20/2024      RE: José Manuel Toussaint  [address] [place] [address]     Dear Colleague,    Thank you for referring your patient, José Manuel Toussaint, to the [facility] SP[facility]TS MEDICINE CLINIC [place]. Please see a copy of my visit note below.    Sports Medicine Clinic           ASSESSMENT and PLAN:     Kishan was seen today for pain.    Diagnoses and all orders for this visit:    Closed nondisplaced fracture of base of fourth metacarpal bone of right hand with routine healing, subsequent encounter  Small fracture at the base of the fourth metacarpal, although not read on original x-rays or repeat x-rays today by the radiologist, there is a small fracture line which is consistent with the patient's exam.  Given this we will immobilize with a short arm cast as well as liza tape the fourth and fifth digits to try to allow patient to have the most dexterity possible while still mobilizing the fracture area.  We did discuss that if he has any significant pain with this type of casting, that he should return for ulnar gutter cast.  -     Cast/splint application  -     Follow-up in 2 to 3 weeks    Pain in joint, ankle and foot  And the end of the visit patient did mention he has been having some pain in his foot that he thinks caused his fall and has a lump within his foot that he would like to have further evaluated.  He will follow-up for this specifically.  -     Sports Med Adult Follow-Up Clinic Order (Blank)      Return sooner if develops new or worsening symptoms.    Options for treatment and/or follow-up care were reviewed with the patient was actively involved in the decision making process. Patient verbalized understanding and was in agreement with the plan.    Emily Huynh MD, [facility]  Primary Care Sports Medicine           SUBJECTIVE       José Manuel Toussaint is a 49 year old male presenting to clinic today with a chief complaint of right hand injury, referred by .    Onset: 8/14/24. Patient  describes injury as coming down on his right foot and fell onto the right hand.  Location of Pain: right 4th metacarpal   Rating of Pain at worst: 7/10  Rating of Pain Currently: 5/10  Worsened by: soreness, movement   Better with: splint  Treatments tried: splint, tylenol and oxycodone   Associated symptoms: no distal numbness or tingling; denies swelling or warmth  Orthopedic history: NO  Relevant surgical history: NO  Social history: social history: works as a reynoso     Pain has been tolerable, but he has been doing okay.    He has been working the whole time. 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 No numbness or tingling.  Slightly sore when he moves his fingers but no sharp pain.      PMH, Medications and Allergies were reviewed and updated as needed.    ROS:  As noted above otherwise negative.    Patient Active Problem List   Diagnosis     Esophageal reflux     Catherine's esophagus     Nausea with vomiting     Closed Fracture Of The Fibula     Gastropathy     SYLVIA (acute kidney injury) (H24)     Dysphagia     Healthcare maintenance     Generalized anxiety disorder     Depression, unspecified depression type     Marijuana smoker, continuous     Rotator cuff tendonitis, left     History of asbestos exposure     Tinnitus, bilateral       Current Outpatient Medications   Medication Sig Dispense Refill     ondansetron (ZOFRAN ODT) 4 MG ODT tab DISSOLVE 1 TABLET (4 MG) BY MOUTH EVERY 8 HOURS AS NEEDED (Patient not taking: Reported on 8/14/2024) 12 tablet 0     traMADol (ULTRAM) 50 MG tablet Take 1 tablet (50 mg) by mouth every 6 hours as needed for severe pain 5 tablet 0            OBJECTIVE:       Vitals: There were no vitals filed for this visit.  BMI: There is no height or weight on file to calculate BMI.    Gen:  Well nourished and in no acute distress  HEENT: Extraocular movement intact  Neck: Supple  Pulm:  Breathing Comfortably. No increased respiratory effort.  Psych:  Euthymic. Appropriately answers questions    MSK:   RIGHT HAND  Inspection:    No obvious deformity or asymmetry, well swelling but ecchymosis over the base of the fourth and fifth metacarpals  Palpation:   Carpals: normal   Metacarpals: 4th metacarpal tender to light palpation   Thumb: normal   Fingers: normal  Range of Motion:  Full to gently range the wrist and the fingers without significant pain  Strength:   strength intact    Imaging was personally reviewed and interpreted by me.   XRAY right hand (8/20/2024): Small, nondisplaced fracture at the base of the fourth metacarpal, no significant change from previous      Cast/splint application    Date/Time: 8/20/2024 9:40 AM    Performed by: Paz Cason ATC  Authorized by: Emily Huynh MD    Consent:     Consent obtained:  Verbal    Consent given by:  Patient    Risks discussed:  Discoloration, numbness, swelling and pain    Alternatives discussed:  No treatment  Pre-procedure details:     Sensation:  Normal  Procedure details:     Laterality:  Right    Location:  Hand    Hand:  R hand    Cast type:  Short arm    Supplies:  Fiberglass  Post-procedure details:     Pain:  Improved    Pain level:  0/10    Sensation:  Normal    Patient tolerance of procedure:  Tolerated well, no immediate complications    Patient provided with cast or splint care instructions: Yes    Comments:      Patient had a scab on upper forearm with bandage per  it was okay to do a shorter short arm. Patient also had a wet blister on palm of hand from previous splint. Okay to leave as it per .         Again, thank you for allowing me to participate in the care of your patient.      Sincerely,    Emily Huynh MD

## 2024-08-20 NOTE — TELEPHONE ENCOUNTER
Patient Contacted and scheduled for the following:    Appointment type: NEW FOOT/ANKLE  Provider: Dr. Chavez  Return date: 9/16/24  Referred by Dr. Huynh

## 2024-09-06 NOTE — TELEPHONE ENCOUNTER
DIAGNOSIS: Pain in joint, ankle and foot    APPOINTMENT DATE: 9/16/24   NOTES STATUS DETAILS   OFFICE NOTE from referring provider Internal 8/20/24 - Emily Huynh MD - Sports Med    MEDICATION LIST Internal

## 2024-09-10 ENCOUNTER — OFFICE VISIT (OUTPATIENT)
Dept: ORTHOPEDICS | Facility: CLINIC | Age: 49
End: 2024-09-10
Payer: COMMERCIAL

## 2024-09-10 ENCOUNTER — ANCILLARY PROCEDURE (OUTPATIENT)
Dept: GENERAL RADIOLOGY | Facility: CLINIC | Age: 49
End: 2024-09-10
Attending: FAMILY MEDICINE
Payer: COMMERCIAL

## 2024-09-10 DIAGNOSIS — S62.344D CLOSED NONDISPLACED FRACTURE OF BASE OF FOURTH METACARPAL BONE OF RIGHT HAND WITH ROUTINE HEALING, SUBSEQUENT ENCOUNTER: Primary | ICD-10-CM

## 2024-09-10 PROCEDURE — 99213 OFFICE O/P EST LOW 20 MIN: CPT | Performed by: FAMILY MEDICINE

## 2024-09-10 PROCEDURE — 73130 X-RAY EXAM OF HAND: CPT | Mod: RT | Performed by: RADIOLOGY

## 2024-09-10 NOTE — PROGRESS NOTES
HISTORY OF PRESENT ILLNESS  Mr. Toussaint is a pleasant 49 year old male following up with a 4th MC fracture.  Kishan suffered his injury on August 14.  He has been in a cast since suffering his injury.  Currently he is doing relatively well, his pain has improved quite a bit.  He is having range of motion issues, although feels much better overall.     PHYSICAL EXAM  General  - normal appearance, in no obvious distress  Musculoskeletal - right wrist  - inspection: normal joint alignment, no obvious deformity, no swelling  - palpation: mildly tender RC joint  - ROM: Globally restricted  - strength: 5/5  strength  Neuro  - no sensory or motor deficit, grossly normal coordination, normal muscle tone          ASSESSMENT & PLAN  Mr. Toussaint is a 49 year old male following up with a 4th metacarpal fracture.    I ordered & independently reviewed an xray of his right hand, this reveals healing of his fourth metacarpal fracture.    We did remove his cast today, we provided him a wrist brace to wear moving forward for comfort and as able.    I am also referring him to hand therapy.    If his symptoms continue to improve we can follow-up as needed for this and other issues.    It was a pleasure seeing Marino Vasquez DO, YOSEF      This note was constructed using Dragon dictation software, please excuse any minor errors in spelling, grammar, or syntax.      Cast removal:    Relevant Diagnosis: 4th metacarpal fx     Patient educated on cast removal process: Yes     Short arm cast was removed per physician instruction.    Skin was observed and found to be intact with no signs of concern:Yes     Concern noted: NA     Person(s) involved in removal:   Patient     Questions asked: NA    Patient sent to x-ray: Yes

## 2024-09-10 NOTE — LETTER
9/10/2024      RE: José Manuel Toussaint  [address] [place] [address]     Dear Colleague,    Thank you for referring your patient, José Manuel Toussaint, to the [facility]TS MEDICINE [facility] [place]. Please see a copy of my visit note below.    HISTORY OF PRESENT ILLNESS  Mr. Toussaint is a pleasant 49 year old male following up with a 4th MC fracture.  Kishan suffered his injury on August 14.  He has been in a cast since suffering his injury.  Currently he is doing relatively well, his pain has improved quite a bit.  He is having range of motion issues, although feels much better overall.     PHYSICAL EXAM  General  - normal appearance, in no obvious distress  Musculoskeletal - right wrist  - inspection: normal joint alignment, no obvious deformity, no swelling  - palpation: mildly tender RC joint  - ROM: Globally restricted  - strength: 5/5  strength  Neuro  - no sensory or motor deficit, grossly normal coordination, normal muscle tone          ASSESSMENT & PLAN  Mr. Toussaint is a 49 year old male following up with a 4th metacarpal fracture.    I ordered & independently reviewed an xray of his right hand, this reveals healing of his fourth metacarpal fracture.    We did remove his cast today, we provided him a wrist brace to wear moving forward for comfort and as able.    I am also referring him to hand therapy.    If his symptoms continue to improve we can follow-up as needed for this and other issues.    It was a pleasure seeing Marino Vasquez DO, CAJUSTINE      This note was constructed using Dragon dictation software, please excuse any minor errors in spelling, grammar, or syntax.      Cast removal:    Relevant Diagnosis: 4th metacarpal fx     Patient educated on cast removal process: Yes     Short arm cast was removed per physician instruction.    Skin was observed and found to be intact with no signs of concern:Yes     Concern noted: NA     Person(s) involved in removal:    Patient     Questions asked: NA    Patient sent to x-ray: Yes       Again, thank you for allowing me to participate in the care of your patient.      Sincerely,    Emile Vasquez, DO

## 2024-09-16 ENCOUNTER — OFFICE VISIT (OUTPATIENT)
Dept: ORTHOPEDICS | Facility: CLINIC | Age: 49
End: 2024-09-16
Payer: COMMERCIAL

## 2024-09-16 ENCOUNTER — ANCILLARY PROCEDURE (OUTPATIENT)
Dept: GENERAL RADIOLOGY | Facility: CLINIC | Age: 49
End: 2024-09-16
Attending: PODIATRIST
Payer: COMMERCIAL

## 2024-09-16 ENCOUNTER — PRE VISIT (OUTPATIENT)
Dept: ORTHOPEDICS | Facility: CLINIC | Age: 49
End: 2024-09-16

## 2024-09-16 DIAGNOSIS — R22.41 MASS OF RIGHT FOOT: ICD-10-CM

## 2024-09-16 DIAGNOSIS — M25.571 PAIN, JOINT, FOOT, RIGHT: ICD-10-CM

## 2024-09-16 DIAGNOSIS — M25.571 PAIN, JOINT, FOOT, RIGHT: Primary | ICD-10-CM

## 2024-09-16 PROCEDURE — 73630 X-RAY EXAM OF FOOT: CPT | Mod: RT | Performed by: RADIOLOGY

## 2024-09-16 PROCEDURE — 99203 OFFICE O/P NEW LOW 30 MIN: CPT | Performed by: PODIATRIST

## 2024-09-16 NOTE — PROGRESS NOTES
OCCUPATIONAL THERAPY EVALUATION  Type of Visit: Evaluation        Fall Risk Screen:  Fall screen completed by: OT  Have you fallen 2 or more times in the past year?: No  Have you fallen and had an injury in the past year?: Yes  Is patient a fall risk?: Department fall risk interventions implemented    Subjective      Presenting condition or subjective complaint: broken bone in hand  Date of onset: 09/10/24 (Referral)    Relevant medical history:     Dates & types of surgery:      Patient comes to therapy today for evaluation and treatment of right wrist pain, and a fourth metacarpal base fracture.  This was sustained on August 14 when he was pulled to the ground while walking his dog.  He was subsequently casted for 3 weeks prior to transitioning into a prefabricated wrist cock up orthosis and liza straps.  He complains of improving stiffness and mobility, has been utilizing his brace only sparingly at night and with activity.  His hand did strike a door frame this morning causing a brief exacerbation of pain and stiffness, however these symptoms are rapidly subsiding, too.  He has been utilizing his orthotic, heat in the shower, and light stretching with fair to good relief.  Patient is a  by [employer], fortunately is off for the next several weeks in order to heal.    Per initial  visit: José Manuel Toussaint is a 49 year old male presenting with a right hand injury.  He has 4th 5th metacarpal MCP joint pain edema bruising following fall on concrete about 2 hours ago.  Initial numbness tingling which has improved.  He was walking his dog and the leash was caught in his hand when the dog pulled and smashed his hand into the curb.  Denies pallor cool sensation pulselessness last range of motion.  He is a reynoso. History of a right-sided wrist fracture 20 years ago, including some residual radial-sided wrist pain.     Per Dr. Huynh 8/20/2024:   Closed nondisplaced fracture of base of fourth metacarpal bone of  right hand with routine healing, subsequent encounter  Small fracture at the base of the fourth metacarpal, although not read on original x-rays or repeat x-rays today by the radiologist, there is a small fracture line which is consistent with the patient's exam.  Given this we will immobilize with a short arm cast as well as liza tape the fourth and fifth digits to try to allow patient to have the most dexterity possible while still mobilizing the fracture area.  We did discuss that if he has any significant pain with this type of casting, that he should return for ulnar gutter cast.    Per Dr. Vasquez 9/10/2024:   Kishan suffered his injury on August 14.  He has been in a cast since suffering his injury.  Currently he is doing relatively well, his pain has improved quite a bit.  He is having range of motion issues, although feels much better overall. (Cast subsequently removed this date).    Prior diagnostic imaging/testing results: X-ray     Prior therapy history for the same diagnosis, illness or injury:        Prior Level of Function  Transfers: Independent  Ambulation: Independent  ADL: Independent  IADL: Driving, Finances, Housekeeping, Laundry, Meal preparation, Medication management, Work, Yard work    Living Environment  Social support: With family members   Type of home: House   Stairs to enter the home:         Ramp: No   Stairs inside the home: Yes       Help at home: None  Equipment owned:       Employment: Yes [employer]  Hobbies/Interests: [employer]    Patient goals for therapy: bend wrist       Objective   ADDITIONAL HISTORY:  Right hand dominant  Patient reports symptoms of pain, stiffness/loss of motion, weakness/loss of strength, and edema  Transportation: drives  Currently working in normal job without restrictions    Functional Outcome Measure:   Upper Extremity Functional Index Score:  SCORE:   Column Totals: /80: 47   (A lower score indicates greater disability.)    PAIN:  Pain Level at Rest:  2/10  Pain Level with Use: 8/10  Pain Location: Right radiocarpal joint, basilar thumb, 4th metacarpal base  Pain Quality: Aching and Sharp  Pain Frequency: intermittent  Pain is Worst: daytime  Pain is Exacerbated By: Gripping, lifting, squeezing, walking the dog  Pain is Relieved By: rest, stretch, and immobilization.   Pain Progression: Improved    POSTURE: Normal     EDEMA:     Wrist/Elbow  (Circumference measured in cm) 9/17/2024   Distal Wrist Crease 16.8 cm to RUE, 18.2 cm to LUE   Elbow Crease    1st Dorsal Compartment    Radial Styloid       SENSATION: WNL throughout all nerve distributions; per patient report     ROM:   Wrist ROM  Left AROM Right AROM    Extension  57   Flexion  25, ++   Radial Deviation (RD)  0   Ulnar Deviation (UD)  23   Supination  76   Pronation  WNL     OBSERVATIONS/APPEARANCE: Tightness: Very mild to the intrinsics         RESISTED TESTING:  NT today.       STRENGTH: Contraindicated    PALPATION:  TTP primarily over the right, third metacarpal base and capitolunate junction. With associated, localized edema.     Assessment & Plan   CLINICAL IMPRESSIONS  Medical Diagnosis: Right 4th MC base fracture    Treatment Diagnosis: Right 4th MC base fracture, right hand pain    Impression/Assessment: Pt is a 49 year old male presenting to Occupational Therapy due to right wrist pain, 4th metacarpal base fracture.  The following significant findings have been identified: Impaired activity tolerance, Impaired coordination, Impaired ROM, Impaired strength, and Pain.  These identified deficits interfere with their ability to perform self care tasks, work tasks, recreational activities, household chores, driving , medication management,  yard work, care of others, and meal planning and preparation as compared to previous level of function.   Patient's limitations or Problem List includes: Pain, Decreased ROM/motion, Increased edema, Weakness, Hypomobility, Decreased , Decreased pinch,  Decreased coordination, Decreased dexterity, and Tightness in musculature of the right elbow, wrist, hand, thumb, index finger, long finger, ring finger, and small finger which interferes with the patient's ability to perform Self Care Tasks (dressing, eating, bathing, hygiene/toileting), Work Tasks, Sleep Patterns, Recreational Activities, Household Chores, and Driving  as compared to previous level of function.    Clinical Decision Making (Complexity):  Assessment of Occupational Performance: 3-5 Performance Deficits  Occupational Performance Limitations: bathing/showering, toileting, dressing, feeding, care of pets, communication management, driving and community mobility, health management and maintenance, home establishment and management, meal preparation and cleanup, shopping, sleep, school, work, leisure activities, and social participation  Clinical Decision Making (Complexity): Low complexity    PLAN OF CARE  Treatment Interventions:  Therapeutic Exercise:  AROM, AAROM, PROM, Tendon Gliding, Blocking, Reverse Blocking, Place and Hold, Contract Relax, Extensor Tracking, Isotonics, Isometrics, and Stabilization  Neuromuscular re-education:  Nerve Gliding, Coordination/Dexterity, Kinesthetic Training, Proprioceptive Training, Posture, Kinesiotaping, Strain Counter Strain, Isometrics, and Stabilization  Manual Techniques:  Coordination/Dexterity, Joint mobilization, Friction massage, Myofascial release, and Manual edema mobilization  Orthotic Fabrication:  Static, Finger based, Hand based, and Forearm based  Self Care:  Self Care Tasks, Ergonomic Considerations, and Work Tasks    Long Term Goals   OT Goal 1  Goal Identifier: Goal I  Goal Description: Patient will complete required ADL, IADL and work tasks with mild or less pain and/or difficulty utilizing orthotics, AE, and joint protection principles as needed.  Rationale: In order to maximize safety and independence with performance of self-care  activities;In order to maximize safety and independence with ADL/IADLs  Goal Progress: New  Target Date: 11/12/24  OT Goal 2  Goal Identifier: Goal II  Goal Description: Patient will exhibit 60 degrees or more of active right wrist flexion and extension without pain or difficulty.  Rationale: In order to maximize safety and independence with performance of self-care activities;In order to maximize safety and independence with ADL/IADLs  Goal Progress: New  Target Date: 11/12/24      Frequency of Treatment: 1x/week  Duration of Treatment: 8 weeks     Education Assessment: Learner/Method: Patient;No Barriers to Learning;Pictures/Video;Demonstration;Reading;Listening     Risks and benefits of evaluation/treatment have been explained.   Patient/Family/caregiver agrees with Plan of Care.     Evaluation Time:    OT Eval, Low Complexity Minutes (78889): 20    Signing Clinician: Eloy Christianson OT

## 2024-09-16 NOTE — LETTER
9/16/2024      José Manuel Toussaint  [address] [place] [address]      Dear Colleague,    Thank you for referring your patient, José Manuel Toussaint, to the [facility] [place]. Please see a copy of my visit note below.    Date of Service: 9/16/2024    Chief Complaint:   Chief Complaint   Patient presents with     Consult     Pain in joint, right ankle and foot        HPI: José Manuel is a 49 year old male who presents today for further evaluation of mass of the right foot.  Nature: No pain today. Had pain about a month ago.     Location: right 1st interspace    Duration: 1 month    Onset: started after having a cabinet slide on the foot.     Course: better for pain. Mas a movable area between the toes now.     Aggravating/alleviating factors: None    Previous Treatments: None      Review of Systems: No n/v/d/f/c/ns/sob/cp    PMH:   Past Medical History:   Diagnosis Date     Catherine's esophagus      Esophageal reflux      Gastropathy      GERD (gastroesophageal reflux disease)      Nausea with vomiting        PSxH:   Past Surgical History:   Procedure Laterality Date     [facility] KNEE SCOPE, DIAGNOSTIC      Description: Arthroscopy Knee;  Recorded: 11/19/2007;     [facility] APPENDECTOMY      Description: Appendectomy;  Recorded: 02/20/2009;     [facility] REPAIR UMBILICAL PATEL,<6Y/O,REDUC      Description: Umbilical Hernia Repair;  Recorded: 03/15/2012;       Allergies: Metoclopramide hcl [metoclopramide], Nsaids, and Prochlorperazine edisylate [prochlorperazine]    SH:   Social History     Socioeconomic History     Marital status: Single     Spouse name: Not on file     Number of children: Not on file     Years of education: Not on file     Highest education level: Not on file   Occupational History     Not on file   Tobacco Use     Smoking status: Never     Passive exposure: Never     Smokeless tobacco: Never   Vaping Use     Vaping status: Never Used   Substance and Sexual Activity     Alcohol use: Not  Currently     Drug use: Yes     Types: Marijuana     Comment: Drug use: 1 gram per day     Sexual activity: Not on file   Other Topics Concern     Not on file   Social History Narrative     Not on file     Social Determinants of Health     Financial Resource Strain: Low Risk  (1/25/2024)    Financial Resource Strain      Within the past 12 months, have you or your family members you live with been unable to get utilities (heat, electricity) when it was really needed?: No   Food Insecurity: Low Risk  (1/25/2024)    Food Insecurity      Within the past 12 months, did you worry that your food would run out before you got money to buy more?: No      Within the past 12 months, did the food you bought just not last and you didn t have money to get more?: Patient declined   Transportation Needs: Low Risk  (1/25/2024)    Transportation Needs      Within the past 12 months, has lack of transportation kept you from medical appointments, getting your medicines, non-medical meetings or appointments, work, or from getting things that you need?: No   Physical Activity: Not on file   Stress: Not on file   Social Connections: Not on file   Interpersonal Safety: Low Risk  (1/25/2024)    Interpersonal Safety      Do you feel physically and emotionally safe where you currently live?: Yes      Within the past 12 months, have you been hit, slapped, kicked or otherwise physically hurt by someone?: No      Within the past 12 months, have you been humiliated or emotionally abused in other ways by your partner or ex-partner?: No   Housing Stability: High Risk (1/25/2024)    Housing Stability      Do you have housing? : Yes      Are you worried about losing your housing?: Yes       FH: No family history on file.    Objective:  Data Unavailable Data Unavailable Data Unavailable Data Unavailable Data Unavailable 0 lbs 0 oz    PT and DP pulses are 2/4 bilaterally. CRT is instant. Positive pedal hair.   Gross sensation is intact bilaterally.    Equinus is mold bilaterally. No pain with active or passive ROM of the ankle, MTJ, 1st ray, or halluces bilaterally. Movable, tense mass to the right 1st interspace that moves through the subcutaneous. No pain with palpation of the area.   Nails normal bilaterally. No open lesions are noted.     right foot xrays indicated in 3 weightbearing views.    No fractures. Normal alignment. No soft tissue abnormality.      Assessment:   Encounter Diagnoses   Name Primary?     Pain, joint, foot, right Yes     Mass of right foot          Plan:  - Pt seen and evaluated.  - XRs taken and independently interpreted by myself. Discussed with pt.  - Will get MRI of the area.  - See again s/p MRI.              Again, thank you for allowing me to participate in the care of your patient.        Sincerely,        Kareem Chavez DPM

## 2024-09-17 ENCOUNTER — THERAPY VISIT (OUTPATIENT)
Dept: OCCUPATIONAL THERAPY | Facility: CLINIC | Age: 49
End: 2024-09-17
Attending: FAMILY MEDICINE
Payer: COMMERCIAL

## 2024-09-17 DIAGNOSIS — S62.344D CLOSED NONDISPLACED FRACTURE OF BASE OF FOURTH METACARPAL BONE OF RIGHT HAND WITH ROUTINE HEALING, SUBSEQUENT ENCOUNTER: ICD-10-CM

## 2024-09-17 DIAGNOSIS — M79.641 PAIN OF RIGHT HAND: Primary | ICD-10-CM

## 2024-09-17 PROCEDURE — 97165 OT EVAL LOW COMPLEX 30 MIN: CPT | Mod: GO | Performed by: OCCUPATIONAL THERAPIST

## 2024-09-17 PROCEDURE — 97530 THERAPEUTIC ACTIVITIES: CPT | Mod: GO | Performed by: OCCUPATIONAL THERAPIST

## 2024-09-17 PROCEDURE — 97110 THERAPEUTIC EXERCISES: CPT | Mod: GO | Performed by: OCCUPATIONAL THERAPIST

## 2024-09-24 ENCOUNTER — THERAPY VISIT (OUTPATIENT)
Dept: OCCUPATIONAL THERAPY | Facility: CLINIC | Age: 49
End: 2024-09-24
Payer: COMMERCIAL

## 2024-09-24 DIAGNOSIS — M79.641 PAIN OF RIGHT HAND: Primary | ICD-10-CM

## 2024-09-24 PROCEDURE — 97110 THERAPEUTIC EXERCISES: CPT | Mod: GO | Performed by: OCCUPATIONAL THERAPIST

## 2024-10-01 ENCOUNTER — HOSPITAL ENCOUNTER (OUTPATIENT)
Dept: MRI IMAGING | Facility: CLINIC | Age: 49
Discharge: HOME | End: 2024-10-01
Attending: PODIATRIST
Payer: COMMERCIAL

## 2024-10-01 ENCOUNTER — HOSPITAL ENCOUNTER (OUTPATIENT)
Dept: RADIOLOGY | Facility: CLINIC | Age: 49
Discharge: HOME | End: 2024-10-01
Attending: PODIATRIST
Payer: COMMERCIAL

## 2024-10-01 DIAGNOSIS — R22.41 MASS OF RIGHT FOOT: ICD-10-CM

## 2024-10-01 PROCEDURE — A9585 GADOBUTROL INJECTION: HCPCS | Performed by: PODIATRIST

## 2024-10-01 PROCEDURE — 255N000002 HC RX 255 OP 636: Performed by: PODIATRIST

## 2024-10-01 PROCEDURE — 70030 X-RAY EYE FOR FOREIGN BODY: CPT

## 2024-10-01 PROCEDURE — 73720 MRI LWR EXTREMITY W/O&W/DYE: CPT | Mod: RT

## 2024-10-01 RX ORDER — GADOBUTROL 604.72 MG/ML
6 INJECTION INTRAVENOUS ONCE
Status: COMPLETED | OUTPATIENT
Start: 2024-10-01 | End: 2024-10-01

## 2024-10-01 RX ADMIN — GADOBUTROL 6 ML: 604.72 INJECTION INTRAVENOUS at 08:50

## 2024-10-02 ENCOUNTER — TELEPHONE (OUTPATIENT)
Dept: ORTHOPEDICS | Facility: CLINIC | Age: 49
End: 2024-10-02
Payer: COMMERCIAL

## 2024-10-02 NOTE — TELEPHONE ENCOUNTER
Message  Received: Today  Kareem Chavez, Thelma Smith, LPN  Hi Thelma -    I got an MRI for this patient. Rin looked at it with me and decided that it should be seen by Dr. Iniguez at his next available!    I did send him a MyChart and let him know.    Thanks!!!    AC

## 2024-10-03 NOTE — TELEPHONE ENCOUNTER
DIAGNOSIS: RIGHT FOOT MASS   APPOINTMENT DATE: 10/04/2024   NOTES STATUS DETAILS   OFFICE NOTE from referring provider Internal 09/16/2024 - Kareem Chavez DPM - [facility] Podiatry   OFFICE NOTE from other specialist Internal 08/20/2024 - Emily Huynh MD - [facility] Sports Medicine    11/21/2017 - Jayme Ca MD - [facility] Internal Medicine   (IMAGES & REPORTS) Internal

## 2024-10-04 ENCOUNTER — PRE VISIT (OUTPATIENT)
Dept: ORTHOPEDICS | Facility: CLINIC | Age: 49
End: 2024-10-04

## 2024-10-16 ENCOUNTER — OFFICE VISIT (OUTPATIENT)
Dept: ORTHOPEDICS | Facility: CLINIC | Age: 49
End: 2024-10-16
Payer: COMMERCIAL

## 2024-10-16 ENCOUNTER — TRANSCRIBE ORDERS (OUTPATIENT)
Dept: OTHER | Age: 49
End: 2024-10-16

## 2024-10-16 VITALS — HEIGHT: 70 IN | BODY MASS INDEX: 21.19 KG/M2 | WEIGHT: 148 LBS

## 2024-10-16 DIAGNOSIS — R22.41 MASS OF RIGHT FOOT: Primary | ICD-10-CM

## 2024-10-16 PROCEDURE — 99204 OFFICE O/P NEW MOD 45 MIN: CPT | Mod: GC | Performed by: ORTHOPAEDIC SURGERY

## 2024-10-16 NOTE — PROGRESS NOTES
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    We discussed the differential and went over imaging with the patient.  We plan to excise this lesion in the operating room.  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     Plan:  Schedule excision right foot mass  Follow-up at time of surgery      --  Sylvester Langston MD  Orthopedic Surgery PGY-4      This note was created using dictation software and may contain errors.  Please contact the creator for any clarifications that are needed.            History of Present Illness:   49 year old male who presents for follow-up of a right first interspace foot mass that he noted approximately 2 months ago.  It appears that this may be noted incidentally as he originally got imaging after a cabinet slid onto his foot resulting in pain.  His pain has improved, however now he notes a mobile mass in his foot that has been largely unchanged since he first noticed it.           Physical Exam:     EXAMINATION pertinent findings:   PSYCH: Pleasant, healthy-appearing, alert, oriented x3, cooperative. Normal mood and affect.  VITAL SIGNS: There were no vitals taken for this visit..  Reviewed nursing intake notes.   There is no height or weight on file to calculate BMI.  RESP: non labored breathing   ABD: benign, soft, non-tender, no acute peritoneal findings  SKIN: grossly normal   LYMPHATIC: grossly normal, no adenopathy, no extremity edema  NEURO: grossly normal , no motor deficits  VASCULAR: satisfactory perfusion of all extremities   MUSCULOSKELETAL:   Firm lesion  overlying the first metatarsal phalangeal joint into the first interspace.  Nontender palpation.  No overlying skin changes.                   Data:   All laboratory data reviewed  All imaging studies reviewed by me    X-ray and MRI reviewed demonstrating soft tissue mass in the first interspace that is focal circumscribed and contiguous with the metatarsophalangeal joint and is dark on STIR sequence which is consistent with a tenosynovial giant cell tumor has a most likely diagnosis      DATA for DOCUMENTATION:         Past Medical History:     Patient Active Problem List   Diagnosis    Esophageal reflux    Catherine's esophagus    Nausea with vomiting    Closed Fracture Of The Fibula    Gastropathy    SYLVIA (acute kidney injury) (H)    Dysphagia    Healthcare maintenance    Generalized anxiety disorder    Depression, unspecified depression type    Marijuana smoker, continuous    Rotator cuff tendonitis, left    History of asbestos exposure    Tinnitus, bilateral    Closed nondisplaced fracture of base of fourth metacarpal bone of right hand with routine healing, subsequent encounter    Pain of right hand     Past Medical History:   Diagnosis Date    Catherine's esophagus     Esophageal reflux     Gastropathy     GERD (gastroesophageal reflux disease)     Nausea with vomiting        Also see scanned health assessment forms.       Past Surgical History:     Past Surgical History:   Procedure Laterality Date     KNEE SCOPE, DIAGNOSTIC      Description: Arthroscopy Knee;  Recorded: 11/19/2007;    [facility] APPENDECTOMY      Description: Appendectomy;  Recorded: 02/20/2009;    [facility] REPAIR UMBILICAL PATEL,<6Y/O,REDUC      Description: Umbilical Hernia Repair;  Recorded: 03/15/2012;            Social History:     Social History     Socioeconomic History    Marital status: Single     Spouse name: Not on file    Number of children: Not on file    Years of education: Not on file    Highest education level: Not on file   Occupational  History    Not on file   Tobacco Use    Smoking status: Never     Passive exposure: Never    Smokeless tobacco: Never   Vaping Use    Vaping status: Never Used   Substance and Sexual Activity    Alcohol use: Not Currently    Drug use: Yes     Types: Marijuana     Comment: Drug use: 1 gram per day    Sexual activity: Not on file   Other Topics Concern    Not on file   Social History Narrative    Not on file     Social Determinants of Health     Financial Resource Strain: Low Risk  (1/25/2024)    Financial Resource Strain     Within the past 12 months, have you or your family members you live with been unable to get utilities (heat, electricity) when it was really needed?: No   Food Insecurity: Low Risk  (1/25/2024)    Food Insecurity     Within the past 12 months, did you worry that your food would run out before you got money to buy more?: No     Within the past 12 months, did the food you bought just not last and you didn t have money to get more?: Patient declined   Transportation Needs: Low Risk  (1/25/2024)    Transportation Needs     Within the past 12 months, has lack of transportation kept you from medical appointments, getting your medicines, non-medical meetings or appointments, work, or from getting things that you need?: No   Physical Activity: Not on file   Stress: Not on file   Social Connections: Not on file   Interpersonal Safety: Low Risk  (9/17/2024)    Interpersonal Safety     Do you feel physically and emotionally safe where you currently live?: Yes     Within the past 12 months, have you been hit, slapped, kicked or otherwise physically hurt by someone?: No     Within the past 12 months, have you been humiliated or emotionally abused in other ways by your partner or ex-partner?: No   Housing Stability: High Risk (1/25/2024)    Housing Stability     Do you have housing? : Yes     Are you worried about losing your housing?: Yes            Family History:     No family history on file.          Medications:     Current Outpatient Medications   Medication Sig Dispense Refill    ondansetron (ZOFRAN ODT) 4 MG ODT tab DISSOLVE 1 TABLET (4 MG) BY MOUTH EVERY 8 HOURS AS NEEDED (Patient not taking: Reported on 8/14/2024) 12 tablet 0    traMADol (ULTRAM) 50 MG tablet Take 1 tablet (50 mg) by mouth every 6 hours as needed for severe pain 5 tablet 0     No current facility-administered medications for this visit.              Review of Systems:   A comprehensive 10 point review of systems (constitutional, ENT, cardiac, peripheral vascular, lymphatic, respiratory, GI, , Musculoskeletal, skin, Neurological) was performed and found to be negative except as described in this note.     See intake form completed by patient

## 2024-10-16 NOTE — LETTER
10/16/2024      José Manuel Toussaint  [address] [place] [address]      Dear Colleague,    Thank you for referring your patient, José Manuel Toussaint, to the [facility] [place]. Please see a copy of my visit note below.        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    We discussed the differential and went over imaging with the patient.  We plan to excise this lesion in the operating room.  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     Plan:  Schedule excision right foot mass  Follow-up at time of surgery      --  ySlvester Langston MD  Orthopedic Surgery PGY-4      This note was created using dictation software and may contain errors.  Please contact the creator for any clarifications that are needed.            History of Present Illness:   49 year old male who presents for follow-up of a right first interspace foot mass that he noted approximately 2 months ago.  It appears that this may be noted incidentally as he originally got imaging after a cabinet slid onto his foot resulting in pain.  His pain has improved, however now he notes a mobile mass in his foot that has been largely unchanged since he first noticed it.           Physical Exam:     EXAMINATION pertinent findings:   PSYCH: Pleasant, healthy-appearing, alert, oriented x3, cooperative. Normal mood and affect.  VITAL SIGNS: There were no vitals taken for this visit..  Reviewed nursing intake notes.   There is no height or weight on file to calculate BMI.  RESP: non labored breathing   ABD: benign, soft,  non-tender, no acute peritoneal findings  SKIN: grossly normal   LYMPHATIC: grossly normal, no adenopathy, no extremity edema  NEURO: grossly normal , no motor deficits  VASCULAR: satisfactory perfusion of all extremities   MUSCULOSKELETAL:   Firm lesion overlying the first metatarsal phalangeal joint into the first interspace.  Nontender palpation.  No overlying skin changes.                   Data:   All laboratory data reviewed  All imaging studies reviewed by me    X-ray and MRI reviewed demonstrating soft tissue mass in the first interspace that is focal circumscribed and contiguous with the metatarsophalangeal joint and is dark on STIR sequence which is consistent with a tenosynovial giant cell tumor has a most likely diagnosis      DATA for DOCUMENTATION:         Past Medical History:     Patient Active Problem List   Diagnosis     Esophageal reflux     Catherine's esophagus     Nausea with vomiting     Closed Fracture Of The Fibula     Gastropathy     SYLVIA (acute kidney injury) (H)     Dysphagia     Healthcare maintenance     Generalized anxiety disorder     Depression, unspecified depression type     Marijuana smoker, continuous     Rotator cuff tendonitis, left     History of asbestos exposure     Tinnitus, bilateral     Closed nondisplaced fracture of base of fourth metacarpal bone of right hand with routine healing, subsequent encounter     Pain of right hand     Past Medical History:   Diagnosis Date     Catherine's esophagus      Esophageal reflux      Gastropathy      GERD (gastroesophageal reflux disease)      Nausea with vomiting        Also see scanned health assessment forms.       Past Surgical History:     Past Surgical History:   Procedure Laterality Date      KNEE SCOPE, DIAGNOSTIC      Description: Arthroscopy Knee;  Recorded: 11/19/2007;     [facility] APPENDECTOMY      Description: Appendectomy;  Recorded: 02/20/2009;     [facility] REPAIR UMBILICAL PATEL,<6Y/O,REDUC      Description: Umbilical Hernia  Repair;  Recorded: 03/15/2012;            Social History:     Social History     Socioeconomic History     Marital status: Single     Spouse name: Not on file     Number of children: Not on file     Years of education: Not on file     Highest education level: Not on file   Occupational History     Not on file   Tobacco Use     Smoking status: Never     Passive exposure: Never     Smokeless tobacco: Never   Vaping Use     Vaping status: Never Used   Substance and Sexual Activity     Alcohol use: Not Currently     Drug use: Yes     Types: Marijuana     Comment: Drug use: 1 gram per day     Sexual activity: Not on file   Other Topics Concern     Not on file   Social History Narrative     Not on file     Social Determinants of Health     Financial Resource Strain: Low Risk  (1/25/2024)    Financial Resource Strain      Within the past 12 months, have you or your family members you live with been unable to get utilities (heat, electricity) when it was really needed?: No   Food Insecurity: Low Risk  (1/25/2024)    Food Insecurity      Within the past 12 months, did you worry that your food would run out before you got money to buy more?: No      Within the past 12 months, did the food you bought just not last and you didn t have money to get more?: Patient declined   Transportation Needs: Low Risk  (1/25/2024)    Transportation Needs      Within the past 12 months, has lack of transportation kept you from medical appointments, getting your medicines, non-medical meetings or appointments, work, or from getting things that you need?: No   Physical Activity: Not on file   Stress: Not on file   Social Connections: Not on file   Interpersonal Safety: Low Risk  (9/17/2024)    Interpersonal Safety      Do you feel physically and emotionally safe where you currently live?: Yes      Within the past 12 months, have you been hit, slapped, kicked or otherwise physically hurt by someone?: No      Within the past 12 months, have you  been humiliated or emotionally abused in other ways by your partner or ex-partner?: No   Housing Stability: High Risk (1/25/2024)    Housing Stability      Do you have housing? : Yes      Are you worried about losing your housing?: Yes            Family History:     No family history on file.         Medications:     Current Outpatient Medications   Medication Sig Dispense Refill     ondansetron (ZOFRAN ODT) 4 MG ODT tab DISSOLVE 1 TABLET (4 MG) BY MOUTH EVERY 8 HOURS AS NEEDED (Patient not taking: Reported on 8/14/2024) 12 tablet 0     traMADol (ULTRAM) 50 MG tablet Take 1 tablet (50 mg) by mouth every 6 hours as needed for severe pain 5 tablet 0     No current facility-administered medications for this visit.              Review of Systems:   A comprehensive 10 point review of systems (constitutional, ENT, cardiac, peripheral vascular, lymphatic, respiratory, GI, , Musculoskeletal, skin, Neurological) was performed and found to be negative except as described in this note.     See intake form completed by patient       I was present with the resident during the history and exam.  I discussed the case with the resident and agree with the findings as documented in the assessment and plan.      Again, thank you for allowing me to participate in the care of your patient.        Sincerely,        Jarrett Iniguez MD

## 2024-10-16 NOTE — NURSING NOTE
"Reason For Visit:   Chief Complaint   Patient presents with    Consult     Right foot mass referred by Dr. Chavez. First noticed around April this year          49 year old  1975      Primary MD: Roscoe Rodríguez  Ref. MD: Dr. Chavez       Ht 1.77 m (5' 9.69\")   Wt 67.1 kg (148 lb)   BMI 21.43 kg/m        Pain Assessment  Patient Currently in Pain: Christies            Calin Werner [facility]    "

## 2024-10-18 ENCOUNTER — TELEPHONE (OUTPATIENT)
Dept: ORTHOPEDICS | Facility: CLINIC | Age: 49
End: 2024-10-18
Payer: COMMERCIAL

## 2024-10-18 NOTE — TELEPHONE ENCOUNTER
Phoned patient to get him scheduled for surgery with Dr. Iniguez.     Call went to voicemail.  Provided call back number in voicemail:   [phone] & [phone] for care team.   Will try again.

## 2024-10-22 ENCOUNTER — TRANSFERRED RECORDS (OUTPATIENT)
Dept: HEALTH INFORMATION MANAGEMENT | Facility: CLINIC | Age: 49
End: 2024-10-22
Payer: COMMERCIAL

## 2024-10-22 PROBLEM — R22.41 MASS OF RIGHT FOOT: Status: ACTIVE | Noted: 2024-10-16

## 2024-10-22 NOTE — TELEPHONE ENCOUNTER
Called patient to schedule surgery with Dr. Iniguez, no answer. Callback number [phone] left on vm.     Anne Templeton on 10/22/2024 at 11:19 AM

## 2024-10-22 NOTE — TELEPHONE ENCOUNTER
Patient is scheduled for surgery with Dr. Avila.     Spoke with: Patient     Date of Surgery: 12/05/24    Location: [facility] OR     Pre op with Provider: JENNIFER     H&P: Patient is scheduled for an in clinic visit with PAC on 11/21/24 at 1:30.     Additional imaging/appointments: Patient is scheduled for a 2-3 week post op on 12/27/24 at 2:20.     Surgery packet: Patient received packet in clinic.      Additional comments: Will let patient know should an earlier date become available.         Anne Templeton on 10/22/2024 at 3:43 PM

## 2024-10-22 NOTE — TELEPHONE ENCOUNTER
Other: Patient would like a call back to schedule surgery. If we can possibly call patient before 1:00 pm. Since he stated he has another appointment and won't be able to answer.     Could we send this information to you in [employer] or would you prefer to receive a phone call?:   Patient would prefer a phone call   Okay to leave a detailed message?: Yes at Cell number on file:    Telephone Information:   Mobile [phone]

## 2024-10-22 NOTE — TELEPHONE ENCOUNTER
Called patient back after 3 pm as requested within vm, no answer. Callback number [phone] left on vm.     Anne Templeton on 10/22/2024 at 3:11 PM

## 2024-10-23 NOTE — TELEPHONE ENCOUNTER
FUTURE VISIT INFORMATION      SURGERY INFORMATION:  Date: 24  [facility]  Surgeon:  Jarrett Iniguez MD   Anesthesia Type:  general  Procedure: Excision right foot mass   Consult: ov 10/16/24    RECORDS REQUESTED FROM:       Primary Care Provider: [facility]    Most recent EKG+ [id number]

## 2024-10-31 ENCOUNTER — TELEPHONE (OUTPATIENT)
Dept: ORTHOPEDICS | Facility: CLINIC | Age: 49
End: 2024-10-31
Payer: COMMERCIAL

## 2024-10-31 NOTE — TELEPHONE ENCOUNTER
A call was placed to the patient and pre-op teaching was performed over the phone.    Teaching Flowsheet   Relevant Diagnosis: Pre-Op Teaching  Teaching Topic: excision of mass right foot DOS: 12/5/24     Person(s) involved in teaching:   Patient     Motivation Level:  Asks Questions: Yes  Eager to Learn: Yes  Cooperative: Yes  Receptive (willing/able to accept information): Yes  Any cultural factors/Yazdanism beliefs that may influence understanding or compliance? No  Comments: none     Patient demonstrates understanding of the following:  Reason for the appointment, diagnosis and treatment plan: Yes  Knowledge of proper use of medications and conditions for which they are ordered (with special attention to potential side effects or drug interactions): Yes  Which situations necessitate calling provider and whom to contact: Yes- discussed the stoplight tool to help assist with this.      Teaching Concerns Addressed:   Comments: none, patient will discuss all medications, supplements, OTC meds at PACS appointment     Proper use of surgical scrub explain and provided to patient.    Pain management techniques: Yes  Wound Care: Yes  How and/when to access community resources: Yes     Instructional Materials Used/Given:      - Important contact info/ phone numbers  - Map/ location of surgery  - Medications to avoid  - Showering instructions  - Stop light tool    Additionally the following was discussed with patient:  - family member will be driving the patient to surgery and staying with them for 24 hours.     In addition to the information above, the following items were also discussed:     -important contact info/ phone numbers  -map/ location of surgery  -medications to avoid  -showering instructions  -stop light tool        -Next step:  PACS appt 11/21/24

## 2024-11-05 PROBLEM — D12.6 ADENOMATOUS POLYP OF COLON, UNSPECIFIED PART OF COLON: Status: ACTIVE | Noted: 2024-11-05

## 2024-11-06 ENCOUNTER — PATIENT OUTREACH (OUTPATIENT)
Dept: GASTROENTEROLOGY | Facility: CLINIC | Age: 49
End: 2024-11-06
Payer: COMMERCIAL

## 2024-11-21 ENCOUNTER — LAB (OUTPATIENT)
Dept: LAB | Facility: CLINIC | Age: 49
End: 2024-11-21
Payer: COMMERCIAL

## 2024-11-21 ENCOUNTER — OFFICE VISIT (OUTPATIENT)
Dept: SURGERY | Facility: CLINIC | Age: 49
End: 2024-11-21
Payer: COMMERCIAL

## 2024-11-21 ENCOUNTER — ANESTHESIA EVENT (OUTPATIENT)
Dept: SURGERY | Facility: AMBULATORY SURGERY CENTER | Age: 49
End: 2024-11-21
Payer: COMMERCIAL

## 2024-11-21 ENCOUNTER — PRE VISIT (OUTPATIENT)
Dept: SURGERY | Facility: CLINIC | Age: 49
End: 2024-11-21

## 2024-11-21 VITALS
HEART RATE: 87 BPM | SYSTOLIC BLOOD PRESSURE: 115 MMHG | BODY MASS INDEX: 20.49 KG/M2 | HEIGHT: 70 IN | WEIGHT: 143.1 LBS | RESPIRATION RATE: 16 BRPM | TEMPERATURE: 98 F | OXYGEN SATURATION: 95 % | DIASTOLIC BLOOD PRESSURE: 77 MMHG

## 2024-11-21 DIAGNOSIS — Z01.818 PREOP EXAMINATION: Primary | ICD-10-CM

## 2024-11-21 DIAGNOSIS — R22.41 MASS OF RIGHT FOOT: ICD-10-CM

## 2024-11-21 LAB
CREAT SERPL-MCNC: 0.99 MG/DL (ref 0.67–1.17)
EGFRCR SERPLBLD CKD-EPI 2021: >90 ML/MIN/1.73M2
ERYTHROCYTE [DISTWIDTH] IN BLOOD BY AUTOMATED COUNT: 13.2 % (ref 10–15)
HCT VFR BLD AUTO: 44.3 % (ref 40–53)
HGB BLD-MCNC: 14.8 G/DL (ref 13.3–17.7)
MCH RBC QN AUTO: 28.8 PG (ref 26.5–33)
MCHC RBC AUTO-ENTMCNC: 33.4 G/DL (ref 31.5–36.5)
MCV RBC AUTO: 86 FL (ref 78–100)
PLATELET # BLD AUTO: 332 10E3/UL (ref 150–450)
RBC # BLD AUTO: 5.13 10E6/UL (ref 4.4–5.9)
WBC # BLD AUTO: 8.5 10E3/UL (ref 4–11)

## 2024-11-21 PROCEDURE — 85027 COMPLETE CBC AUTOMATED: CPT | Performed by: PATHOLOGY

## 2024-11-21 PROCEDURE — 36415 COLL VENOUS BLD VENIPUNCTURE: CPT | Performed by: PATHOLOGY

## 2024-11-21 PROCEDURE — 82565 ASSAY OF CREATININE: CPT | Performed by: PATHOLOGY

## 2024-11-21 RX ORDER — ACETAMINOPHEN 325 MG/1
325-650 TABLET ORAL EVERY 6 HOURS PRN
COMMUNITY

## 2024-11-21 RX ORDER — ALBUTEROL SULFATE 90 UG/1
2 INHALANT RESPIRATORY (INHALATION) EVERY 6 HOURS PRN
COMMUNITY

## 2024-11-21 ASSESSMENT — ENCOUNTER SYMPTOMS: SEIZURES: 0

## 2024-11-21 ASSESSMENT — PAIN SCALES - GENERAL: PAINLEVEL_OUTOF10: NO PAIN (0)

## 2024-11-21 NOTE — PATIENT INSTRUCTIONS
"Preparing for Your Surgery      Name:  José Manuel Toussaint \"Kishan\"   MRN:  [id number]   :  1975   Today's Date:  2024         Arriving for surgery:  Surgery date:  24  Arrival time:  11:45 am  Surgery time: 1:15 pm    Restrictions due to COVID 19:    Please maintain social distance.  Masking is optional        parking is available for anyone with mobility limitations or disabilities. (Monday- Friday 7 am- 5 pm)    Please come to:    [facility]  [address] [place] [id number]    Check in on the 5th floor, Ambulatory Surgery Center.    What can I eat or drink?    -  You may eat and drink normally until 8 hours before arrival time  (Until 3:45 am on 24)  -  You may have clear liquids until 2 hours before arrival time  (Until 9:45 am on 24)    Examples of clear liquids:  Water  Clear broth  Juices (apple, white grape, white cranberry  and cider) without pulp  Noncarbonated, powder based beverages  (lemonade and Tim-Aid)  Sodas (Sprite, 7-Up, ginger ale and seltzer)  Coffee or tea (without milk or cream)  Gatorade    --No alcohol or cannabis products for at least 24 hours before surgery    Which medicines can I take?    Hold Ibuprofen (Advil, Motrin) for 1 day before surgery--unless otherwise directed by surgeon.  Hold Naproxen (Aleve) for 4 days before surgery.        -  PLEASE TAKE the following medications the day of surgery    Acetaminophen (Tylenol) as needed      How do I prepare myself?  - Please take 2 showers (one the night prior to surgery and one the morning of surgery) using Scrubcare or Hibiclens soap.    Use this soap only from the neck to your toes. Avoid genital area      Leave the soap on your skin for one minute--then rinse thoroughly.      You may use your own shampoo and conditioner; no other hair products.   - Please remove all jewelry and body piercings.  - No lotions, deodorants or fragrance.  - Bring your ID and insurance " card.    -If you have a Deep Brain Stimulator, a Spinal Cord Stimulator or any implanted Neuro device you must bring the remote to the Surgery Center          ALL PATIENTS ARE REQUIRED TO HAVE A RESPONSIBLE ADULT TO DRIVE AND BE IN ATTENDANCE WITH THEM FOR 24 HOURS FOLLOWING SURGERY       Covid testing policy as of 12/06/2022  Your surgeon will notify and schedule you for a COVID test if one is needed before surgery--please direct any questions or COVID symptoms to your surgeon      Questions or Concerns:    -For questions regarding the day of surgery please contact the Ambulatory Surgery Center at [phone].    -If you have health changes between today and your surgery please contact your surgeon.     For questions after surgery please call your surgeons office.

## 2024-11-21 NOTE — H&P
Pre-Operative H & P     CC:  Preoperative exam to assess for increased cardiopulmonary risk while undergoing surgery and anesthesia.    Date of Encounter: 11/21/2024  Primary Care Physician:  Roscoe Rodríguez     Reason for visit:   Encounter Diagnoses   Name Primary?    Preop examination Yes    Mass of right foot        HPI  José Manuel Toussaint is a 49 year old male who presents for pre-operative H & P in preparation for  Procedure Information       Case: [id number] Date/Time: 12/05/24 1315    Procedure: Excision right foot mass (Right: Leg)    Anesthesia type: General    Diagnosis: Mass of right foot [R22.41]    Pre-op diagnosis: Mass of right foot [R22.41]    Location: [place] [id number] / [facility]    Providers: Jarrett Iniguez MD            The patient presents to the PAC in person  today in preparation for the above scheduled procedure with comorbid conditions including GERD, Catherine's esophagus, gastropathy, dysphagia, h/o SYLVIA, anxiety, depression, marijuana use, h/o asbestos exposure and intermittent b/l tinitus.    The patient was seen in  Orthopaedic Oncology Surgery Consultation with Dr. 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  Dr. Iniguez counseled the patient of the findings and treatment options.  The patient has now been scheduled for the procedure as listed above.        Of significance, the patient was seen in urgent care on 11/11/2024 for bronchitis and treated with azithromycin ans steroid dose pack.  He has completed the prescriptions with resolution of symptoms.     History is obtained from the patient and chart review    Hx of abnormal bleeding or anti-platelet use: denies      Past Medical History  Past Medical History:   Diagnosis Date    Catherine's esophagus     Esophageal reflux     Gastropathy     GERD (gastroesophageal reflux  disease)     Nausea with vomiting        Past Surgical History  Past Surgical History:   Procedure Laterality Date    HC KNEE SCOPE, DIAGNOSTIC      Description: Arthroscopy Knee;  Recorded: 11/19/2007;    ZC APPENDECTOMY      Description: Appendectomy;  Recorded: 02/20/2009;    ZZ REPAIR UMBILICAL PATEL,<4Y/O,REDUC      Description: Umbilical Hernia Repair;  Recorded: 03/15/2012;       Prior to Admission Medications  Current Outpatient Medications   Medication Sig Dispense Refill    acetaminophen (TYLENOL) 325 MG tablet Take 325-650 mg by mouth every 6 hours as needed for mild pain.      albuterol (PROAIR HFA/PROVENTIL HFA/VENTOLIN HFA) 108 (90 Base) MCG/ACT inhaler Inhale 2 puffs into the lungs every 6 hours as needed for shortness of breath, wheezing or cough.      traMADol (ULTRAM) 50 MG tablet Take 1 tablet (50 mg) by mouth every 6 hours as needed for severe pain (Patient not taking: Reported on 10/16/2024) 5 tablet 0       Allergies  Allergies   Allergen Reactions    Metoclopramide Hcl [Metoclopramide] Unknown     Tremor      Nsaids     Prochlorperazine Edisylate [Prochlorperazine] Unknown     Tremor         Social History  Social History     Socioeconomic History    Marital status: Single     Spouse name: Not on file    Number of children: Not on file    Years of education: Not on file    Highest education level: Not on file   Occupational History    Not on file   Tobacco Use    Smoking status: Never     Passive exposure: Never    Smokeless tobacco: Never   Vaping Use    Vaping status: Never Used   Substance and Sexual Activity    Alcohol use: Not Currently    Drug use: Yes     Types: Marijuana     Comment: Drug use: 1 gram per day    Sexual activity: Not on file   Other Topics Concern    Not on file   Social History Narrative    Not on file     Social Drivers of Health     Financial Resource Strain: Low Risk  (1/25/2024)    Financial Resource Strain     Within the past 12 months, have you or your family  members you live with been unable to get utilities (heat, electricity) when it was really needed?: No   Food Insecurity: Low Risk  (1/25/2024)    Food Insecurity     Within the past 12 months, did you worry that your food would run out before you got money to buy more?: No     Within the past 12 months, did the food you bought just not last and you didn t have money to get more?: Patient declined   Transportation Needs: Low Risk  (1/25/2024)    Transportation Needs     Within the past 12 months, has lack of transportation kept you from medical appointments, getting your medicines, non-medical meetings or appointments, work, or from getting things that you need?: No   Physical Activity: Not on file   Stress: Not on file   Social Connections: Not on file   Interpersonal Safety: Low Risk  (9/17/2024)    Interpersonal Safety     Do you feel physically and emotionally safe where you currently live?: Yes     Within the past 12 months, have you been hit, slapped, kicked or otherwise physically hurt by someone?: No     Within the past 12 months, have you been humiliated or emotionally abused in other ways by your partner or ex-partner?: No   Housing Stability: High Risk (1/25/2024)    Housing Stability     Do you have housing? : Yes     Are you worried about losing your housing?: Yes       Family History  No family history on file.    Review of Systems  The complete review of systems is negative other than noted in the HPI or here.   Anesthesia Evaluation   Pt has had prior anesthetic. Type: MAC.    History of anesthetic complications (Patient reports that with his most recent colonoscopy, he did fine.)  - PONV.      ROS/MED HX  ENT/Pulmonary: Comment: He thinks he may have been exposed to Asbestos in the past. He is a reynoso.     Treated for bronchitis on 11/11/2024 with Azythromycin and prednisone. Denies any ongoing symptoms.     (+)                     Intermittent, asthma (Exercise induced.)        recent URI,        "   Neurologic: Comment: Intermittent tinnitus.    (-) no seizures, no CVA and migraines   Cardiovascular: Comment: Denies cardiac symptoms including chest pain, SOB, palpitations, syncope, LANDERS, orthopnea, or PND.        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  Enjoys snowboarding in the winter.    Hematologic:     (+)      anemia,       (-) history of blood clots and history of blood transfusion   Musculoskeletal:       GI/Hepatic: Comment: Patient following with GI in 2015 for GERD with Catherine's esophagus, Gastropathy and Dysphagia. Patient report that he is not taking any medications at this time and no longer has symptoms.     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  He has a bandage on the posterior aspect of his neck and anterior aspect of his neck covering sores that he reports are associated his picking disorder.    (+) psychiatric history (Reports he is stable off of medications.) anxiety and depression   Recreational drug usage: Cannabis (Smokes marijuana daily.). (-) alcohol abuse history   Infectious Disease:  - neg infectious disease ROS     Malignancy:    (-) malignancy   Other:  - neg other ROS          /77 (BP " "Location: Right arm, Patient Position: Sitting, Cuff Size: Adult Regular)   Pulse 87   Temp 98  F (36.7  C) (Oral)   Resp 16   Ht 1.77 m (5' 9.7\")   Wt 64.9 kg (143 lb 1.6 oz)   SpO2 95%   BMI 20.71 kg/m      Physical Exam   Constitutional: Awake, alert, cooperative, no apparent distress, and appears stated age.  Eyes: Pupils equal, round and reactive to light, extra ocular muscles intact, sclera clear, conjunctiva normal.  HENT: Normocephalic, oral pharynx with moist mucus membranes, good dentition. No goiter appreciated.   Respiratory: Clear to auscultation bilaterally, no crackles or wheezing.  Cardiovascular: Regular rate and rhythm, normal S1 and S2, and no murmur noted.  Carotids +2, no bruits. No edema. Palpable pulses to radial  DP and PT arteries.   GI: Normal bowel sounds, soft, non-distended, non-tender, no masses palpated, no hepatosplenomegaly.    Lymph/Hematologic: No cervical lymphadenopathy and no supraclavicular lymphadenopathy.  Skin: Warm and dry.    Musculoskeletal: Full ROM of neck. There is no redness, warmth, or swelling of the joints. Gross motor strength is normal.    Neurologic: Awake, alert, oriented to name, place and time. Cranial nerves II-XII are grossly intact. Gait is normal.   Neuropsychiatric: Calm, cooperative. Normal affect.     Prior Labs/Diagnostic Studies   All labs and imaging personally reviewed    Latest Reference Range & Units 04/11/24 13:09   Sodium 135 - 145 mmol/L 138   Potassium 3.4 - 5.3 mmol/L 3.4   Chloride 98 - 107 mmol/L 97 (L)   Carbon Dioxide (CO2) 22 - 29 mmol/L 22   Urea Nitrogen 6.0 - 20.0 mg/dL 14.0   Creatinine 0.67 - 1.17 mg/dL 0.88   GFR Estimate >60 mL/min/1.73m2 >90   Calcium 8.6 - 10.0 mg/dL 10.1 (H)   Anion Gap 7 - 15 mmol/L 19 (H)   Albumin 3.5 - 5.2 g/dL 5.0   Protein Total 6.4 - 8.3 g/dL 8.0   Alkaline Phosphatase 40 - 150 U/L 130   ALT 0 - 70 U/L 30   AST 0 - 45 U/L 30   Bilirubin Total <=1.2 mg/dL 1.1   Glucose 70 - 99 mg/dL 166 (H) "   Lipase 13 - 60 U/L 12 (L)   WBC 4.0 - 11.0 10e3/uL 16.6 (H)   Hemoglobin 13.3 - 17.7 g/dL 15.8   Hematocrit 40.0 - 53.0 % 45.2   Platelet Count 150 - 450 10e3/uL 370   RBC Count 4.40 - 5.90 10e6/uL 5.44   MCV 78 - 100 fL 83   MCH 26.5 - 33.0 pg 29.0   MCHC 31.5 - 36.5 g/dL 35.0   RDW 10.0 - 15.0 % 12.8   % Neutrophils % 83   % Lymphocytes % 11   % Monocytes % 5   % Eosinophils % 0   % Basophils % 0   Absolute Basophils 0.0 - 0.2 10e3/uL 0.1   Absolute Eosinophils 0.0 - 0.7 10e3/uL 0.0   Absolute Immature Granulocytes <=0.4 10e3/uL 0.1   Absolute Lymphocytes 0.8 - 5.3 10e3/uL 1.9   Absolute Monocytes 0.0 - 1.3 10e3/uL 0.8   % Immature Granulocytes % 1   Absolute Neutrophils 1.6 - 8.3 10e3/uL 13.7 (H)   Absolute NRBCs 10e3/uL 0.0   NRBCs per 100 WBC <1 /100 0   (L): Data is abnormally low  (H): Data is abnormally high    PROCEDURES  MR FOOT RIGHT W/O and W CONTRAST  LOCATION: [facility]  DATE: 10/1/2024     INDICATION: Tense soft tissue mass that is movable thru the soft tissue in the 1st interspace.                                                                 IMPRESSION:  1.  Nonspecific soft tissue mass corresponding to the area of clinical concern. This most likely represents a benign structures such as a fibroma, but malignancy such as synovial sarcoma cannot be completely excluded. Removal or soft tissue sampling   recommended.  2.  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  EKG/ stress test - if available please see in ROS above   No results found.       No data to display                  The patient's records and results personally reviewed by this provider.     Outside records reviewed from: Care Everywhere    LAB/DIAGNOSTIC STUDIES TODAY:     Latest Reference Range & Units 11/21/24 14:38   WBC 4.0 - 11.0 10e3/uL 8.5   Hemoglobin 13.3 - 17.7 g/dL 14.8   Hematocrit 40.0 - 53.0 % 44.3   Platelet Count 150 - 450 10e3/uL 332   RBC Count 4.40 - 5.90 10e6/uL 5.13   MCV 78 - 100 fL 86    MCH 26.5 - 33.0 pg 28.8   MCHC 31.5 - 36.5 g/dL 33.4   RDW 10.0 - 15.0 % 13.2       Assessment    José Manuel Toussaint is a 49 year old male seen as a PAC referral for risk assessment and optimization for anesthesia.    Plan/Recommendations  Pt will be optimized for the proposed procedure.  See below for details on the assessment, risk, and preoperative recommendations    NEUROLOGY  - Intermittent b/l tinnitus    - No history of TIA, CVA or seizure    -Post Op delirium risk factors:  No risk identified    ENT  - No current airway concerns.  Will need to be reassessed day of surgery.  Mallampati: I  TM: > 3    CARDIAC  - No history of CAD, Hypertension, and Afib    -  Denies cardiac symptoms.    - METS (Metabolic Equivalents)  Patient performs 4 or more METS exercise without symptoms             Total Score: 0      - RCRI-Very low risk: Class 1 0.4% complication rate             Total Score: 0        PULMONARY  - Bronchitis, resolved  Seen in urgent care on 11/11/24 treated with azithromycin and prednisone  Denies any ongoing symptoms    - MARRY Low Risk             Total Score: 2    MARRY: Over 50 ys old    MARRY: Male      - Asthma, Well controlled  Reports only symptoms with exercise.  Albuterol as needed    - Patient is a reynoso and he thinks he has been exposed to asbestos but does not know for sure.     - Tobacco History    History   Smoking Status    Never   Smokeless Tobacco    Never       GI  - h/o GERD, dysphagia and gastropathy with extensive evaluation in 2015.    Denies any ongoing symptoms.     - H/o Catherine's esophagus  After EGD in 2015, had significant PONV and has not followed up since  Reports he did well with his recent colonoscopy and MAC  Encouraged to get connected with GI for management.     - PONV High Risk  Total Score: 3           1 AN PONV: Patient is not a current smoker    1 AN PONV: Patient has history of PONV    1 AN PONV: Intended Post Op Opioids        /RENAL  - Baseline Creatinine   "see above     - h/o SYLVIA in setting of dehydration, nausea and vomiting    ENDOCRINE    - BMI: Estimated body mass index is 20.71 kg/m  as calculated from the following:    Height as of this encounter: 1.77 m (5' 9.7\").    Weight as of this encounter: 64.9 kg (143 lb 1.6 oz).  Healthy Weight (BMI 18.5-24.9)    - No history of Diabetes Mellitus    HEME  VTE Low Risk 0.5%             Total Score: 2    VTE: Male      - No history of abnormal bleeding or antiplatelet use.    - Denies a h/o anemia or previous blood transfusion      MSK  - Mass in the first interspace of the right foot most consistent with a tenosynovial giant cell tumor.   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     Different anesthesia methods/types have been discussed with the patient, but they are aware that the final plan will be decided by the assigned anesthesia provider on the date of service.    Patient was discussed with Dr. Rees    The patient is optimized for their procedure. AVS with information on surgery time/arrival time, meds and NPO status given by nursing staff. No further diagnostic testing indicated.      On the day of service:     Prep time: 15 minutes  Visit time: 20 minutes  Documentation time: 15 minutes  ------------------------------------------  Total time: 50 minutes      MONICA Lawson CNP  Preoperative Assessment Center  [facility]  Clinic and Surgery Center  Phone: [phone]  Fax: [phone]    "

## 2024-11-29 ENCOUNTER — TELEPHONE (OUTPATIENT)
Dept: SURGERY | Facility: CLINIC | Age: 49
End: 2024-11-29
Payer: COMMERCIAL

## 2024-11-29 NOTE — TELEPHONE ENCOUNTER
[facility] Call Center    Phone Message    May a detailed message be left on voicemail: yes     Reason for Call: Other: Kishan is calling in asking for a call back from his pre op team, as he had woken up with bad sinus issues that were causing nausea and is wondering if this could change whether he is cleared for his procedure on 12/5. Please call back as soon as possible to discuss.     Action Taken: Message routed to:  [facility] ([facility]): PAC    Travel Screening: Not Applicable     Date of Service:

## 2024-12-02 NOTE — TELEPHONE ENCOUNTER
Kishan reported he had one day of sneezing, but that has subsided.  He did not report any fevers.  Will follow up with the PAC provider.  Yessenia Arndt RN

## 2024-12-04 RX ORDER — DEXAMETHASONE SODIUM PHOSPHATE 10 MG/ML
4 INJECTION, SOLUTION INTRAMUSCULAR; INTRAVENOUS
OUTPATIENT
Start: 2024-12-04

## 2024-12-04 RX ORDER — ONDANSETRON 4 MG/1
4 TABLET, ORALLY DISINTEGRATING ORAL EVERY 30 MIN PRN
OUTPATIENT
Start: 2024-12-04

## 2024-12-04 RX ORDER — OXYCODONE HYDROCHLORIDE 5 MG/1
5 TABLET ORAL
OUTPATIENT
Start: 2024-12-04

## 2024-12-04 RX ORDER — NALOXONE HYDROCHLORIDE 0.4 MG/ML
0.1 INJECTION, SOLUTION INTRAMUSCULAR; INTRAVENOUS; SUBCUTANEOUS
OUTPATIENT
Start: 2024-12-04

## 2024-12-04 RX ORDER — ONDANSETRON 2 MG/ML
4 INJECTION INTRAMUSCULAR; INTRAVENOUS EVERY 30 MIN PRN
OUTPATIENT
Start: 2024-12-04

## 2024-12-04 RX ORDER — OXYCODONE HYDROCHLORIDE 5 MG/1
10 TABLET ORAL
OUTPATIENT
Start: 2024-12-04

## 2024-12-05 ENCOUNTER — HOSPITAL ENCOUNTER (OUTPATIENT)
Facility: AMBULATORY SURGERY CENTER | Age: 49
End: 2024-12-05
Attending: ORTHOPAEDIC SURGERY
Payer: COMMERCIAL

## 2024-12-05 ENCOUNTER — ANESTHESIA (OUTPATIENT)
Dept: SURGERY | Facility: AMBULATORY SURGERY CENTER | Age: 49
End: 2024-12-05
Payer: COMMERCIAL

## 2024-12-05 VITALS
WEIGHT: 145 LBS | RESPIRATION RATE: 16 BRPM | HEART RATE: 63 BPM | DIASTOLIC BLOOD PRESSURE: 74 MMHG | BODY MASS INDEX: 20.3 KG/M2 | SYSTOLIC BLOOD PRESSURE: 116 MMHG | OXYGEN SATURATION: 98 % | HEIGHT: 71 IN | TEMPERATURE: 97.3 F

## 2024-12-05 DIAGNOSIS — R22.41 MASS OF RIGHT FOOT: Primary | ICD-10-CM

## 2024-12-05 PROCEDURE — 88342 IMHCHEM/IMCYTCHM 1ST ANTB: CPT | Mod: TC | Performed by: ORTHOPAEDIC SURGERY

## 2024-12-05 PROCEDURE — 88342 IMHCHEM/IMCYTCHM 1ST ANTB: CPT | Mod: 26 | Performed by: PATHOLOGY

## 2024-12-05 PROCEDURE — 88307 TISSUE EXAM BY PATHOLOGIST: CPT | Mod: TC | Performed by: ORTHOPAEDIC SURGERY

## 2024-12-05 PROCEDURE — 88307 TISSUE EXAM BY PATHOLOGIST: CPT | Mod: 26 | Performed by: PATHOLOGY

## 2024-12-05 RX ORDER — DEXAMETHASONE SODIUM PHOSPHATE 10 MG/ML
4 INJECTION, SOLUTION INTRAMUSCULAR; INTRAVENOUS
Status: ACTIVE | OUTPATIENT
Start: 2024-12-05

## 2024-12-05 RX ORDER — BUPIVACAINE HYDROCHLORIDE 2.5 MG/ML
INJECTION, SOLUTION INFILTRATION; PERINEURAL PRN
Status: DISCONTINUED | OUTPATIENT
Start: 2024-12-05 | End: 2024-12-05 | Stop reason: HOSPADM

## 2024-12-05 RX ORDER — LIDOCAINE HYDROCHLORIDE 20 MG/ML
INJECTION, SOLUTION INFILTRATION; PERINEURAL PRN
Status: DISCONTINUED | OUTPATIENT
Start: 2024-12-05 | End: 2024-12-05

## 2024-12-05 RX ORDER — ACETAMINOPHEN 325 MG/1
650 TABLET ORAL
Status: ACTIVE | OUTPATIENT
Start: 2024-12-05

## 2024-12-05 RX ORDER — ONDANSETRON 4 MG/1
4 TABLET, ORALLY DISINTEGRATING ORAL EVERY 30 MIN PRN
Status: ACTIVE | OUTPATIENT
Start: 2024-12-05

## 2024-12-05 RX ORDER — PROPOFOL 10 MG/ML
INJECTION, EMULSION INTRAVENOUS CONTINUOUS PRN
Status: DISCONTINUED | OUTPATIENT
Start: 2024-12-05 | End: 2024-12-05

## 2024-12-05 RX ORDER — DEXAMETHASONE SODIUM PHOSPHATE 4 MG/ML
INJECTION, SOLUTION INTRA-ARTICULAR; INTRALESIONAL; INTRAMUSCULAR; INTRAVENOUS; SOFT TISSUE PRN
Status: DISCONTINUED | OUTPATIENT
Start: 2024-12-05 | End: 2024-12-05

## 2024-12-05 RX ORDER — AMOXICILLIN 250 MG
1-2 CAPSULE ORAL 2 TIMES DAILY
Qty: 30 TABLET | Refills: 0 | Status: SHIPPED | OUTPATIENT
Start: 2024-12-05

## 2024-12-05 RX ORDER — HYDROMORPHONE HYDROCHLORIDE 1 MG/ML
0.4 INJECTION, SOLUTION INTRAMUSCULAR; INTRAVENOUS; SUBCUTANEOUS EVERY 5 MIN PRN
Status: ACTIVE | OUTPATIENT
Start: 2024-12-05

## 2024-12-05 RX ORDER — HYDROMORPHONE HYDROCHLORIDE 1 MG/ML
0.2 INJECTION, SOLUTION INTRAMUSCULAR; INTRAVENOUS; SUBCUTANEOUS EVERY 5 MIN PRN
Status: ACTIVE | OUTPATIENT
Start: 2024-12-05

## 2024-12-05 RX ORDER — CEFAZOLIN SODIUM 2 G/50ML
2 SOLUTION INTRAVENOUS
Status: COMPLETED | OUTPATIENT
Start: 2024-12-05 | End: 2024-12-05

## 2024-12-05 RX ORDER — FENTANYL CITRATE 50 UG/ML
50 INJECTION, SOLUTION INTRAMUSCULAR; INTRAVENOUS EVERY 5 MIN PRN
Status: ACTIVE | OUTPATIENT
Start: 2024-12-05

## 2024-12-05 RX ORDER — OXYCODONE HYDROCHLORIDE 5 MG/1
5-10 TABLET ORAL EVERY 4 HOURS PRN
Qty: 12 TABLET | Refills: 0 | Status: SHIPPED | OUTPATIENT
Start: 2024-12-05

## 2024-12-05 RX ORDER — SODIUM CHLORIDE, SODIUM LACTATE, POTASSIUM CHLORIDE, CALCIUM CHLORIDE 600; 310; 30; 20 MG/100ML; MG/100ML; MG/100ML; MG/100ML
INJECTION, SOLUTION INTRAVENOUS CONTINUOUS
Status: ACTIVE | OUTPATIENT
Start: 2024-12-05

## 2024-12-05 RX ORDER — PROPOFOL 10 MG/ML
INJECTION, EMULSION INTRAVENOUS PRN
Status: DISCONTINUED | OUTPATIENT
Start: 2024-12-05 | End: 2024-12-05

## 2024-12-05 RX ORDER — NALOXONE HYDROCHLORIDE 0.4 MG/ML
0.1 INJECTION, SOLUTION INTRAMUSCULAR; INTRAVENOUS; SUBCUTANEOUS
Status: ACTIVE | OUTPATIENT
Start: 2024-12-05

## 2024-12-05 RX ORDER — ONDANSETRON 2 MG/ML
4 INJECTION INTRAMUSCULAR; INTRAVENOUS EVERY 30 MIN PRN
Status: ACTIVE | OUTPATIENT
Start: 2024-12-05

## 2024-12-05 RX ORDER — OXYCODONE HYDROCHLORIDE 5 MG/1
5 TABLET ORAL
Status: ACTIVE | OUTPATIENT
Start: 2024-12-05

## 2024-12-05 RX ORDER — ACETAMINOPHEN 325 MG/1
975 TABLET ORAL ONCE
Status: COMPLETED | OUTPATIENT
Start: 2024-12-05 | End: 2024-12-05

## 2024-12-05 RX ORDER — CEFAZOLIN SODIUM 2 G/50ML
2 SOLUTION INTRAVENOUS SEE ADMIN INSTRUCTIONS
Status: ACTIVE | OUTPATIENT
Start: 2024-12-05

## 2024-12-05 RX ORDER — ACETAMINOPHEN 325 MG/1
650 TABLET ORAL EVERY 4 HOURS PRN
Qty: 50 TABLET | Refills: 0 | Status: SHIPPED | OUTPATIENT
Start: 2024-12-05

## 2024-12-05 RX ORDER — ONDANSETRON 2 MG/ML
INJECTION INTRAMUSCULAR; INTRAVENOUS PRN
Status: DISCONTINUED | OUTPATIENT
Start: 2024-12-05 | End: 2024-12-05

## 2024-12-05 RX ORDER — FENTANYL CITRATE 50 UG/ML
INJECTION, SOLUTION INTRAMUSCULAR; INTRAVENOUS PRN
Status: DISCONTINUED | OUTPATIENT
Start: 2024-12-05 | End: 2024-12-05

## 2024-12-05 RX ORDER — LIDOCAINE 40 MG/G
CREAM TOPICAL
Status: ACTIVE | OUTPATIENT
Start: 2024-12-05

## 2024-12-05 RX ORDER — FENTANYL CITRATE 50 UG/ML
25 INJECTION, SOLUTION INTRAMUSCULAR; INTRAVENOUS EVERY 5 MIN PRN
Status: ACTIVE | OUTPATIENT
Start: 2024-12-05

## 2024-12-05 RX ADMIN — FENTANYL CITRATE 50 MCG: 50 INJECTION, SOLUTION INTRAMUSCULAR; INTRAVENOUS at 13:39

## 2024-12-05 RX ADMIN — ONDANSETRON 4 MG: 2 INJECTION INTRAMUSCULAR; INTRAVENOUS at 13:33

## 2024-12-05 RX ADMIN — FENTANYL CITRATE 25 MCG: 50 INJECTION, SOLUTION INTRAMUSCULAR; INTRAVENOUS at 13:37

## 2024-12-05 RX ADMIN — ACETAMINOPHEN 975 MG: 325 TABLET ORAL at 12:39

## 2024-12-05 RX ADMIN — CEFAZOLIN SODIUM 2 G: 2 SOLUTION INTRAVENOUS at 13:13

## 2024-12-05 RX ADMIN — PROPOFOL 150 MG: 10 INJECTION, EMULSION INTRAVENOUS at 13:20

## 2024-12-05 RX ADMIN — PROPOFOL 50 MG: 10 INJECTION, EMULSION INTRAVENOUS at 13:39

## 2024-12-05 RX ADMIN — LIDOCAINE HYDROCHLORIDE 100 MG: 20 INJECTION, SOLUTION INFILTRATION; PERINEURAL at 13:20

## 2024-12-05 RX ADMIN — PROPOFOL 150 MCG/KG/MIN: 10 INJECTION, EMULSION INTRAVENOUS at 13:20

## 2024-12-05 RX ADMIN — FENTANYL CITRATE 25 MCG: 50 INJECTION, SOLUTION INTRAMUSCULAR; INTRAVENOUS at 13:20

## 2024-12-05 RX ADMIN — SODIUM CHLORIDE, SODIUM LACTATE, POTASSIUM CHLORIDE, CALCIUM CHLORIDE: 600; 310; 30; 20 INJECTION, SOLUTION INTRAVENOUS at 12:40

## 2024-12-05 RX ADMIN — DEXAMETHASONE SODIUM PHOSPHATE 4 MG: 4 INJECTION, SOLUTION INTRA-ARTICULAR; INTRALESIONAL; INTRAMUSCULAR; INTRAVENOUS; SOFT TISSUE at 13:33

## 2024-12-05 ASSESSMENT — ENCOUNTER SYMPTOMS: SEIZURES: 0

## 2024-12-05 NOTE — ANESTHESIA PREPROCEDURE EVALUATION
Anesthesia Pre-Procedure Evaluation    Patient: José Manuel Toussaint   MRN: [id number] : 1975        Procedure : Procedure(s):  Excision right foot mass          Past Medical History:   Diagnosis Date    Catherine's esophagus     Esophageal reflux     Gastropathy     GERD (gastroesophageal reflux disease)     Nausea with vomiting       Past Surgical History:   Procedure Laterality Date    HC KNEE SCOPE, DIAGNOSTIC      Description: Arthroscopy Knee;  Recorded: 2007;    ZZC APPENDECTOMY      Description: Appendectomy;  Recorded: 2009;    ZZHC REPAIR UMBILICAL PATEL,<4Y/O,REDUC      Description: Umbilical Hernia Repair;  Recorded: 03/15/2012;      Allergies   Allergen Reactions    Metoclopramide Hcl [Metoclopramide] Unknown     Tremor      Nsaids     Prochlorperazine Edisylate [Prochlorperazine] Unknown     Tremor        Social History     Tobacco Use    Smoking status: Never     Passive exposure: Never    Smokeless tobacco: Never   Substance Use Topics    Alcohol use: Not Currently      Wt Readings from Last 1 Encounters:   24 65.8 kg (145 lb)        Anesthesia Evaluation   Pt has had prior anesthetic. Type: MAC.    History of anesthetic complications (Patient reports that with his most recent colonoscopy, he did fine.)  - PONV.      ROS/MED HX  ENT/Pulmonary: Comment: He thinks he may have been exposed to Asbestos in the past. He is a reynoso.     Treated for bronchitis on 2024 with Azythromycin and prednisone. Denies any ongoing symptoms.     (+)                     Intermittent, asthma (Exercise induced.)        recent URI,          Neurologic: Comment: Intermittent tinnitus.    (-) no seizures, no CVA and migraines   Cardiovascular: Comment: Denies cardiac symptoms including chest pain, SOB, palpitations, syncope, LANDERS, orthopnea, or PND.        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  Enjoys snowboarding in the winter.    Hematologic:     (+)      anemia,       (-) history of blood clots and history of blood transfusion   Musculoskeletal:       GI/Hepatic: Comment: Patient following with GI in 2015 for GERD with Catherine's esophagus, Gastropathy and Dysphagia. Patient report that he is not taking any medications at this time and no longer has symptoms.     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  He has a bandage on the posterior aspect of his neck and anterior aspect of his neck covering sores that he reports are associated his picking disorder.    (+) psychiatric history (Reports he is stable off of medications.) anxiety and depression   Recreational drug usage: Cannabis (Smokes marijuana daily.). (-) alcohol abuse history   Infectious Disease:  - neg infectious disease ROS     Malignancy:    (-) malignancy   Other:  - neg other ROS          Physical Exam    Airway        Mallampati: II       Respiratory Devices and Support         Dental       (+) Minor Abnormalities - some fillings, tiny chips      Cardiovascular          Rhythm and rate: regular     Pulmonary                   OUTSIDE LABS:  CBC:   Lab Results   Component Value Date    WBC 8.5 11/21/2024    WBC 16.6 (H) 04/11/2024    HGB 14.8 " "11/21/2024    HGB 15.8 04/11/2024    HCT 44.3 11/21/2024    HCT 45.2 04/11/2024     11/21/2024     04/11/2024     BMP:   Lab Results   Component Value Date     04/11/2024     01/11/2020    POTASSIUM 3.4 04/11/2024    POTASSIUM 3.7 01/11/2020    CHLORIDE 97 (L) 04/11/2024    CHLORIDE 105 01/11/2020    CO2 22 04/11/2024    CO2 25 01/11/2020    BUN 14.0 04/11/2024    BUN 30 (H) 01/11/2020    CR 0.99 11/21/2024    CR 0.88 04/11/2024     (H) 04/11/2024    GLC 94 01/11/2020     COAGS: No results found for: \"PTT\", \"INR\", \"FIBR\"  POC: No results found for: \"BGM\", \"HCG\", \"HCGS\"  HEPATIC:   Lab Results   Component Value Date    ALBUMIN 5.0 04/11/2024    PROTTOTAL 8.0 04/11/2024    ALT 30 04/11/2024    AST 30 04/11/2024    ALKPHOS 130 04/11/2024    BILITOTAL 1.1 04/11/2024     OTHER:   Lab Results   Component Value Date    LACT 1.0 01/10/2020    A1C 5.5 01/25/2024    OPAL 10.1 (H) 04/11/2024    MAG 2.2 01/10/2020    LIPASE 12 (L) 04/11/2024    TSH 1.69 01/25/2024       Anesthesia Plan    ASA Status:  2    NPO Status:  NPO Appropriate    Anesthesia Type: General.     - Airway: LMA   Induction: Intravenous.   Maintenance: TIVA.        Consents    Anesthesia Plan(s) and associated risks, benefits, and realistic alternatives discussed. Questions answered and patient/representative(s) expressed understanding.     - Discussed:     - Discussed with:  Patient            Postoperative Care            Comments:               Humberto Parker MD    I have reviewed the pertinent notes and labs in the chart from the past 30 days and (re)examined the patient.  Any updates or changes from those notes are reflected in this note.                                   "

## 2024-12-05 NOTE — ANESTHESIA CARE TRANSFER NOTE
Patient: José Manuel Toussaint    Procedure: Procedure(s):  Excision right foot mass       Diagnosis: Mass of right foot [R22.41]  Diagnosis Additional Information: No value filed.    Anesthesia Type:   General     Note:    Oropharynx: spontaneously breathing and oropharynx clear of all foreign objects  Level of Consciousness: drowsy  Oxygen Supplementation: face mask  Level of Supplemental Oxygen (L/min / FiO2): 5  Independent Airway: airway patency satisfactory and stable  Dentition: dentition unchanged  Vital Signs Stable: post-procedure vital signs reviewed and stable  Report to RN Given: handoff report given  Patient transferred to: PACU  Comments: Resps easy and regular. Report to PACU RN  Handoff Report: Identifed the Patient, Identified the Reponsible Provider, Reviewed the pertinent medical history, Discussed the surgical course, Reviewed Intra-OP anesthesia mangement and issues during anesthesia, Set expectations for post-procedure period and Allowed opportunity for questions and acknowledgement of understanding    Vitals:  Vitals Value Taken Time   BP 96/68 12/05/24 1410   Temp     Pulse 64 12/05/24 1411   Resp 13 12/05/24 1411   SpO2 99 % 12/05/24 1411   Vitals shown include unfiled device data.    Electronically Signed By: MONICA TSE CRNA  December 5, 2024  2:12 PM

## 2024-12-05 NOTE — ANESTHESIA POSTPROCEDURE EVALUATION
Patient: José Manuel Toussaint    Procedure: Procedure(s):  Excision right foot mass       Anesthesia Type:  General    Note:  Disposition: Outpatient   Postop Pain Control: Uneventful            Sign Out: Well controlled pain   PONV: No   Neuro/Psych: Uneventful            Sign Out: Acceptable/Baseline neuro status   Airway/Respiratory: Uneventful            Sign Out: Acceptable/Baseline resp. status   CV/Hemodynamics: Uneventful            Sign Out: Acceptable CV status; No obvious hypovolemia; No obvious fluid overload   Other NRE: NONE   DID A NON-ROUTINE EVENT OCCUR?            Last vitals:  Vitals Value Taken Time   /70 12/05/24 1430   Temp 36.4  C (97.6  F) 12/05/24 1430   Pulse 60 12/05/24 1426   Resp 16 12/05/24 1430   SpO2 98 % 12/05/24 1430   Vitals shown include unfiled device data.    Electronically Signed By: Humberto Parker MD  December 5, 2024  4:15 PM

## 2024-12-05 NOTE — DISCHARGE INSTRUCTIONS
"[facility]  Home Care Following Anesthesia  For 24 hours after surgery:  Get plenty of rest.  A responsible adult must stay with you for at least 24 hours after you leave the surgery center.  Do not drive or use heavy equipment.  If you have weakness or tingling, don't drive or use heavy equipment until this feeling goes away.   Do not drink alcohol.   Avoid strenuous or risky activities.  Ask for help when climbing stairs.  You may feel lightheaded.  IF so, sit for a few minutes before standing.  Have someone help you get up.   If you have nausea (feel sick to your stomach): Drink only clear liquids such as apple juice, ginger ale, broth or 7-Up.  Rest may also help.  Be sure to drink enough fluids.  Move to a regular diet as you feel able.   You may have a slight fever.  Call the doctor if your fever is over 100 F (37.7 C) (taken under the tongue) or lasts longer than 24 hours.  You may have a dry mouth, a sore throat, muscle aches or trouble sleeping. These should go away after 24 hours.  Do not make important or legal decisions.   It is recommended to avoid smoking.        Today you received a Marcaine or bupivacaine block to numb the nerves near your surgery site.  This is a block using local anesthetic or \"numbing\" medication injected around the nerves to anesthetize or \"numb\" the area supplied by those nerves.  This block is injected into the muscle layer near your surgical site.  The medication may numb the location where you had surgery for 6-18 hours, but may last up to 24 hours.  If your surgical site is an arm or leg you should be careful with your affected limb, since it is possible to injure your limb without being aware of it due to the numbing.  Until full feeling returns, you should guard against bumping or hitting your limb, and avoid extreme hot or cold temperatures on the skin.  As the block wears off, the feeling will return as a tingling or prickly " sensation near your surgical site.  You will experience more discomfort from your incision as the feeling returns.  You may want to take a pain pill (a narcotic or Tylenol if this was prescribed by your surgeon) when you start to experience mild pain before the pain beccomes more severe.  If your pain medications do not control your pain you should notifiy your surgeon.    Tips for taking pain medications  To get the best pain relief possible, remember these points:  Take pain medications as directed, before pain becomes severe.  Pain medication can upset your stomach: taking it with food may help.  Constipation is a common side effect of pain medication. Drink plenty of  fluids.  Eat foods high in fiber. Take a stool softener if recommended by your doctor or pharmacist.  Do not drink alcohol, drive or operate machinery while taking pain medications.  Ask about other ways to control pain, such as with heat, ice or relaxation.    Tylenol/Acetaminophen Consumption    If you feel your pain relief is insufficient, you may take Tylenol/Acetaminophen in addition to your narcotic pain medication.   Be careful not to exceed 4,000 mg of Tylenol/Acetaminophen in a 24 hour period from all sources.  If you are taking extra strength Tylenol/acetaminophen (500 mg), the maximum dose is 8 tablets in 24 hours.  If you are taking regular strength acetaminophen (325 mg), the maximum dose is 12 tablets in 24 hours.  Tylenol 975 mg given at 12:40 pm.   Ok to take more after 6:40 pm.      Call a doctor for any of the following:  Signs of infection (fever, growing tenderness at the surgery site, a large amount of drainage or bleeding, severe pain, foul-smelling drainage, redness, swelling).  It has been over 8 to 10 hours since surgery and you are still not able to urinate (pass water).  Headache for over 24 hours.  Numbness, tingling or weakness the day after surgery (if you had spinal anesthesia).  Signs of Covid-19 infection  (temperature over 100 degrees, shortness of breath, cough, loss of taste/smell, generalized body aches, persistent headache, chills, sore throat, nausea/vomiting/diarrhea)  Your doctor is:       Dr. Jarrett Iniguez, Orthopaedics: [phone]               Or dial [phone] and ask for the resident on call for:  Orthopaedics  For emergency care, call the:  [facility]:  [phone] (TTY for hearing impaired: [phone])

## 2024-12-05 NOTE — OP NOTE
DATE OF SURGERY: 12/5/2024    PREOPERATIVE DIAGNOSIS: Right foot mass    POSTOPERATIVE DIAGNOSIS: Right foot mass    PROCEDURE: Excision right foot mass, 2.5 cm, subcutaneous    SURGEON: Jarrett Iniguez MD     ASSISTANT: José Manuel Beebe MD    PATIENT HISTORY: This patient has a history of a mass in the foot and presents now to have this excised.  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  The patient understands the risks of bleeding infection pain numbness and tingling.    DESCRIPTION OF PROCEDURE: The patient underwent successful induction of anesthesia.  The right foot was washed and sterilely prepped and draped.  We made an incision over the visible palpable mass in the first webspace.  After incising skin sharply I divided the superficial subcutaneous tissue with cautery and then began to bluntly dissect around the mass.  There is a sensory nerve that we reflected off the mass.  We were able to finally release it from some of the deep tissues and sent and intact in formalin to pathology.  We copiously irrigated and cauterized some small bleeding vessels.  We then closed with Vicryl in the subcutaneous tissue Monocryl and the skin Steri-Strips and a sterile dry dressing.  The patient was extubated and taken to the recovery room in stable condition yes minute blood loss is 2 mL.  I was present for all critical portions of the procedure.    Jarrett Iniguez MD

## 2024-12-05 NOTE — BRIEF OP NOTE
[facility]    Brief Operative Note    Pre-operative diagnosis: Mass of right foot [R22.41]  Post-operative diagnosis Same as pre-operative diagnosis    Procedure: Excision right foot mass, Right - Leg    Surgeon: Surgeons and Role:     * Jarrett Iniguez MD - Primary     * José Manuel Beebe MD - Resident - Assisting  Anesthesia: General   Estimated Blood Loss: 2cc    Drains: None  Specimens:   ID Type Source Tests Collected by Time Destination   1 : Right foot mass Tissue Foot, Right SURGICAL PATHOLOGY EXAM Jarrett Iniguez MD 12/5/2024  1:44 PM      Findings:   See full dictated operative report .  Complications: None.      Same day surgery  Pain management: OTC tylenol/NSAIDs, oral narcotic for breakthrough pain  Activity: up ad aye  Weight bearing status: WBAT  DVT prophylaxis: Mechanical  Wound Care: Island dressing x 5 days, change if wet/soiled. Okay to shower with dressing covered  Disposition: Home per PACU protocol      Future Appointments 12/5/2024 - 6/3/2025        Date Visit Type Length Department Provider     12/27/2024  2:20 PM POST OP MSK 20 min [facility] Yessenia Parker PA-C    Location Instructions:     The [facility] [place] with multiple transportation and parking options. You may wish to review options for  service and self-parking in more detail on the [facility] s website at [email or web address].&nbsp;                       José Manuel Beebe MD  Orthopaedic Surgery Resident  [facility]  Pager: [phone]  12/05/2024

## 2024-12-05 NOTE — ANESTHESIA PROCEDURE NOTES
Airway       Patient location during procedure: OR  Staff -        CRNA: Kendra Payton APRN CRNA       Performed By: SRNAIndications and Patient Condition       Indications for airway management: shin-procedural       Induction type:intravenous       Mask difficulty assessment: 0 - not attempted    Final Airway Details       Final airway type: supraglottic airway    Supraglottic Airway Details        Type: LMA       Brand: LMA Unique       LMA size: 5    Post intubation assessment        Placement verified by: capnometry and equal breath sounds        Number of attempts at approach: 2       Number of other approaches attempted: 0       Secured with: tape       Ease of procedure: easy       Dentition: Intact and Unchanged

## 2024-12-10 LAB
PATH REPORT.COMMENTS IMP SPEC: NORMAL
PATH REPORT.COMMENTS IMP SPEC: NORMAL
PATH REPORT.FINAL DX SPEC: NORMAL
PATH REPORT.GROSS SPEC: NORMAL
PATH REPORT.MICROSCOPIC SPEC OTHER STN: NORMAL
PATH REPORT.MICROSCOPIC SPEC OTHER STN: NORMAL
PATH REPORT.RELEVANT HX SPEC: NORMAL
PHOTO IMAGE: NORMAL

## 2024-12-27 ENCOUNTER — OFFICE VISIT (OUTPATIENT)
Dept: ORTHOPEDICS | Facility: CLINIC | Age: 49
End: 2024-12-27
Payer: COMMERCIAL

## 2024-12-27 DIAGNOSIS — D21.21 FIBROMA OF FOOT, RIGHT: Primary | ICD-10-CM

## 2024-12-27 PROCEDURE — 99024 POSTOP FOLLOW-UP VISIT: CPT | Performed by: PHYSICIAN ASSISTANT

## 2024-12-27 NOTE — NURSING NOTE
Reason For Visit:   Chief Complaint   Patient presents with    Surgical Followup     DOS 12/05/24 S/P Excision right foot mass       There were no vitals taken for this visit.    Pain Assessment  Patient Currently in Pain: Ayan Gonzalez LPN

## 2024-12-27 NOTE — PROGRESS NOTES
Chief Complaint:   DATE OF SURGERY: 12/5/2024  POSTOPERATIVE DIAGNOSIS: Right foot mass  PROCEDURE: Excision right foot mass, 2.5 cm, subcutaneous  SURGEON: Jarrett Iniguez MD      HPI: Kishan is a 49 year old man who is here 2.5 weeks s/p above procedure.  Patient reports overall he is doing well.  He denies any numbness or tingling.  He does feel his second toe wants to stay extended.  He is wearing a regular shoe.  No other concerns.     Physical Exam: Kisahn is a 49 year old man who is alert and oriented and in no distress.  He has a non-antalgic gait without gait assistance.  Right foot incision healing well with no erythema or drainage.  He is able to perform flexion and extension of the toes, but he lacks terminal flexion of the 2nd toe.  NV intact distally.    Pathology:   Final Diagnosis   Soft tissue, right foot mass, excision:  - Fibroma of tendon sheath  - Negative for malignancy     Impression: 49 year old man doing well s/p excision of fibroma of tendon sheath from right foot    Plan: Patient can shower and get the incision wet.  No soaking in a tub or pool for 2 weeks.  OK to use vitamin E oil or cocoa butter on the incisions.  Cover incisions as needed for comfort.  Continue to ice and elevate for swelling control.  Gradually increase activity as tolerated.  Work on PROM and AAROM of toes.  I explained that this tumor can recur.  Return if he notices any problems or masses.  Otherwise follow-up as needed.  All questions were answered today.

## 2024-12-27 NOTE — LETTER
12/27/2024      José Manuel Toussaint  [address] [place] [address]      Dear Colleague,    Thank you for referring your patient, José Manuel Toussaint, to the [facility] [place]. Please see a copy of my visit note below.    Chief Complaint:   DATE OF SURGERY: 12/5/2024  POSTOPERATIVE DIAGNOSIS: Right foot mass  PROCEDURE: Excision right foot mass, 2.5 cm, subcutaneous  SURGEON: Jarrett Iniguez MD      HPI: Kishan is a 49 year old man who is here 2.5 weeks s/p above procedure.  Patient reports overall he is doing well.  He denies any numbness or tingling.  He does feel his second toe wants to stay extended.  He is wearing a regular shoe.  No other concerns.     Physical Exam: Kishan is a 49 year old man who is alert and oriented and in no distress.  He has a non-antalgic gait without gait assistance.  Right foot incision healing well with no erythema or drainage.  He is able to perform flexion and extension of the toes, but he lacks terminal flexion of the 2nd toe.  NV intact distally.    Pathology:   Final Diagnosis   Soft tissue, right foot mass, excision:  - Fibroma of tendon sheath  - Negative for malignancy     Impression: 49 year old man doing well s/p excision of fibroma of tendon sheath from right foot    Plan: Patient can shower and get the incision wet.  No soaking in a tub or pool for 2 weeks.  OK to use vitamin E oil or cocoa butter on the incisions.  Cover incisions as needed for comfort.  Continue to ice and elevate for swelling control.  Gradually increase activity as tolerated.  Work on PROM and AAROM of toes.  I explained that this tumor can recur.  Return if he notices any problems or masses.  Otherwise follow-up as needed.  All questions were answered today.        Again, thank you for allowing me to participate in the care of your patient.        Sincerely,        Yessenia Parker PA-C    Electronically signed

## 2025-02-26 PROBLEM — M75.82 ROTATOR CUFF TENDONITIS, LEFT: Status: RESOLVED | Noted: 2024-01-25 | Resolved: 2025-02-26

## 2025-03-15 ENCOUNTER — HEALTH MAINTENANCE LETTER (OUTPATIENT)
Age: 50
End: 2025-03-15

## 2025-03-16 ENCOUNTER — OFFICE VISIT (OUTPATIENT)
Dept: URGENT CARE | Facility: URGENT CARE | Age: 50
End: 2025-03-16
Payer: COMMERCIAL

## 2025-03-16 VITALS
DIASTOLIC BLOOD PRESSURE: 78 MMHG | HEIGHT: 71 IN | HEART RATE: 81 BPM | BODY MASS INDEX: 19.6 KG/M2 | WEIGHT: 140 LBS | RESPIRATION RATE: 15 BRPM | TEMPERATURE: 98.4 F | OXYGEN SATURATION: 96 % | SYSTOLIC BLOOD PRESSURE: 120 MMHG

## 2025-03-16 DIAGNOSIS — H61.22 IMPACTED CERUMEN OF LEFT EAR: Primary | ICD-10-CM

## 2025-03-16 PROCEDURE — 3074F SYST BP LT 130 MM HG: CPT | Performed by: PHYSICIAN ASSISTANT

## 2025-03-16 PROCEDURE — 3078F DIAST BP <80 MM HG: CPT | Performed by: PHYSICIAN ASSISTANT

## 2025-03-16 PROCEDURE — 69209 REMOVE IMPACTED EAR WAX UNI: CPT | Mod: LT | Performed by: PHYSICIAN ASSISTANT

## 2025-03-16 NOTE — PROGRESS NOTES
Impacted cerumen of left ear  - ID REMOVAL IMPACTED CERUMEN IRRIGATION/LVG UNILAT    Left ear/s were irrigated with a warm water/hydrogen peroxide solution. Cerumen was removed without complication. Ear/s were re-examined and found to be normal.     Patient was recommended to use Debrox on a weekly basis by rinsing in the shower to prevent earwax buildup in the future.    Tadeo Guevara PA-C  [facility]    Subjective   49 year old who presents to clinic today for the following health issues:    Urgent Care       HPI     Acute Illness  Acute illness concerns: Left ear plugged probable wax  Onset/Duration: 2-3 days  Symptoms:  Fever: No  Chills/Sweats: No  Headache (location?): No  Sinus Pressure: No  Conjunctivitis:  No  Ear Pain: YES: left-hearing loss and ear fullness but no sharp pain.  Rhinorrhea: No  Congestion: No  Sore Throat: No  Cough: no  Wheeze: No  Decreased Appetite: No  Nausea: No  Vomiting: No  Diarrhea: No  Dysuria/Freq.: No  Dysuria or Hematuria: No  Fatigue/Achiness: No  Sick/Strep Exposure: No  Therapies tried and outcome: None    Review of Systems   Review of Systems   See HPI    Objective    Temp: 98.4  F (36.9  C) Temp src: Temporal BP: 120/78 Pulse: 81   Resp: 15 SpO2: 96 %       Physical Exam   Physical Exam  Constitutional:       General: He is not in acute distress.     Appearance: Normal appearance. He is normal weight. He is not ill-appearing, toxic-appearing or diaphoretic.   HENT:      Head: Normocephalic and atraumatic.      Right Ear: Tympanic membrane, ear canal and external ear normal. There is no impacted cerumen.      Left Ear: Tympanic membrane, ear canal and external ear normal. There is impacted cerumen.   Cardiovascular:      Rate and Rhythm: Normal rate.      Pulses: Normal pulses.   Pulmonary:      Effort: Pulmonary effort is normal. No respiratory distress.   Neurological:      General: No focal deficit present.      Mental Status: He is alert and  oriented to person, place, and time. Mental status is at baseline.      Gait: Gait normal.   Psychiatric:         Mood and Affect: Mood normal.         Behavior: Behavior normal.         Thought Content: Thought content normal.         Judgment: Judgment normal.          No results found for this or any previous visit (from the past 24 hours).

## (undated) RX ORDER — CEFAZOLIN SODIUM 2 G/50ML
SOLUTION INTRAVENOUS
Status: DISPENSED
Start: 2024-12-05

## (undated) RX ORDER — ONDANSETRON 2 MG/ML
INJECTION INTRAMUSCULAR; INTRAVENOUS
Status: DISPENSED
Start: 2024-12-05

## (undated) RX ORDER — FENTANYL CITRATE 50 UG/ML
INJECTION, SOLUTION INTRAMUSCULAR; INTRAVENOUS
Status: DISPENSED
Start: 2024-12-05

## (undated) RX ORDER — PROPOFOL 10 MG/ML
INJECTION, EMULSION INTRAVENOUS
Status: DISPENSED
Start: 2024-12-05

## (undated) RX ORDER — DEXAMETHASONE SODIUM PHOSPHATE 4 MG/ML
INJECTION, SOLUTION INTRA-ARTICULAR; INTRALESIONAL; INTRAMUSCULAR; INTRAVENOUS; SOFT TISSUE
Status: DISPENSED
Start: 2024-12-05

## (undated) RX ORDER — ACETAMINOPHEN 325 MG/1
TABLET ORAL
Status: DISPENSED
Start: 2024-12-05